# Patient Record
Sex: FEMALE | Race: BLACK OR AFRICAN AMERICAN | Employment: FULL TIME | URBAN - METROPOLITAN AREA
[De-identification: names, ages, dates, MRNs, and addresses within clinical notes are randomized per-mention and may not be internally consistent; named-entity substitution may affect disease eponyms.]

---

## 2017-01-27 NOTE — TELEPHONE ENCOUNTER
Return call made to pt using two identifiers. Pt made aware that if she  still having issues with the cough form Nov.  she need to come in to be evaluated  Because it's been so long and the provider will not give her a Rx for Tussinex or Tessalon Perles with out evaluating her. . Pt stated that they are short at work right now. I advised Pt to try the OTC Robitussin or Delsm and if she doesn't get any better then come in to be seen. Pt verbalized understanding.

## 2017-01-27 NOTE — TELEPHONE ENCOUNTER
Patient called and requested a refill of the Tussionex. I informed her that that type of medication requires an appointment because it is considered a narcotic drug. Patient then requested Tessalon perles instead. She states that her cough has gotten a little better but it is still lingering.

## 2017-03-02 ENCOUNTER — OFFICE VISIT (OUTPATIENT)
Dept: FAMILY MEDICINE CLINIC | Age: 57
End: 2017-03-02

## 2017-03-02 VITALS
HEART RATE: 71 BPM | TEMPERATURE: 98 F | RESPIRATION RATE: 18 BRPM | HEIGHT: 67 IN | DIASTOLIC BLOOD PRESSURE: 88 MMHG | SYSTOLIC BLOOD PRESSURE: 138 MMHG | WEIGHT: 223 LBS | BODY MASS INDEX: 35 KG/M2 | OXYGEN SATURATION: 95 %

## 2017-03-02 DIAGNOSIS — I10 ESSENTIAL HYPERTENSION: Primary | ICD-10-CM

## 2017-03-02 DIAGNOSIS — Z12.39 BREAST CANCER SCREENING: ICD-10-CM

## 2017-03-02 DIAGNOSIS — M54.50 CHRONIC BILATERAL LOW BACK PAIN WITHOUT SCIATICA: ICD-10-CM

## 2017-03-02 DIAGNOSIS — L30.8 OTHER ECZEMA: ICD-10-CM

## 2017-03-02 DIAGNOSIS — G89.29 CHRONIC BILATERAL LOW BACK PAIN WITHOUT SCIATICA: ICD-10-CM

## 2017-03-02 RX ORDER — PREDNISONE 20 MG/1
20 TABLET ORAL
Qty: 7 TAB | Refills: 0 | Status: SHIPPED | OUTPATIENT
Start: 2017-03-02 | End: 2018-04-29

## 2017-03-02 RX ORDER — TRAMADOL HYDROCHLORIDE 50 MG/1
50 TABLET ORAL
Qty: 30 TAB | Refills: 0 | Status: SHIPPED | OUTPATIENT
Start: 2017-03-02 | End: 2018-04-17 | Stop reason: SDUPTHER

## 2017-03-02 RX ORDER — CLOBETASOL PROPIONATE 0.5 MG/G
CREAM TOPICAL 2 TIMES DAILY
Qty: 30 G | Refills: 3 | Status: SHIPPED | OUTPATIENT
Start: 2017-03-02 | End: 2018-04-17 | Stop reason: ALTCHOICE

## 2017-03-02 NOTE — PROGRESS NOTES
Chronic Illness Visit    Today's Date:  3/2/2017   Patient's Name: Lizabeth Jerome   Patient's :  1960     History:     Chief Complaint   Patient presents with    Follow Up Chronic Condition     HTN     Dry Skin     pt presents for eczema  pt states \" that pt was using Triamcinolone ointment and Betamethasone but it sems liek the medications are not working \"    Medication Refill     Pain medication        Lizabeth Jerome is a 60 y.o. female presenting for a follow up of Chronic Illness. Hypertension/Hyperlipidemia     BP today is not at goal and less [id number] improved on recheck. Patients risk factors include: obese  Patient is checking home BP    Reports compliance and denies side effects to medications  Daily exercise and diet are as follows: denies healthy diet or exercise  Weight is stable  Takes the below meds regularly with no side effects. Last LDL: 147    Eczema     Having an acute flare on the right ankle/shin since the last visit. She was improving with her steroid cream but the symptoms reoccurred over the past few weeks. Onset more than 10 years ago  Reports diffuse patches of dry skin (over upper extensor and legs)which worsens when the weather gets colder. Worse lesion is on the lower right leg.   Has not seen DERM in years  Reports symptoms are stable on steroid cream but says the cream is very expensive    Chronic Back Pain    Onset: more than 6 months  Denies history of trauma/injury  Character of Pain: achy/throbbing  Denies problem with bowels or urine  Alleviating/Agravating Factors: worse with excessive activity or walking  Current meds: tramadol prn            Past Medical History:   Diagnosis Date    Anemia NEC     Hypertension      Past Surgical History:   Procedure Laterality Date    HX CYST REMOVAL  late 20's or early 30's    Right     Social History     Social History    Marital status: SINGLE     Spouse name: N/A    Number of children: N/A    Years of education: N/A     Social History Main Topics    Smoking status: Never Smoker    Smokeless tobacco: Never Used    Alcohol use Yes      Comment: Socially    Drug use: No    Sexual activity: Not Asked     Other Topics Concern    None     Social History Narrative     Family History   Problem Relation Age of Onset    Hypertension Mother      No Known Allergies    Problem List:      Patient Active Problem List   Diagnosis Code    HTN (hypertension) I10    Abnormal uterine bleeding N93.9    Vitamin D deficiency E55.9    Elevated LDL cholesterol level E78.00    Iron deficiency anemia due to chronic blood loss D50.0       Medications:     Current Outpatient Prescriptions   Medication Sig    clobetasol (TEMOVATE) 0.05 % topical cream Apply  to affected area two (2) times a day.  predniSONE (DELTASONE) 20 mg tablet Take 1 Tab by mouth daily (with breakfast).  traMADol (ULTRAM) 50 mg tablet Take 1 Tab by mouth every eight (8) hours as needed for Pain. Max Daily Amount: 150 mg.    lisinopril-hydrochlorothiazide (PRINZIDE, ZESTORETIC) 20-12.5 mg per tablet Take 1 Tab by mouth daily.  naproxen (NAPROSYN) 500 mg tablet Take 1 Tab by mouth two (2) times daily (with meals).  predniSONE (DELTASONE) 20 mg tablet Take 1 Tab by mouth daily (with breakfast).  ibuprofen (MOTRIN) 800 mg tablet Take 1 Tab by mouth every eight (8) hours as needed for Pain.  ferrous sulfate 324 mg (65 mg iron) TbEC Take  by mouth. No current facility-administered medications for this visit.           Constitutional: negative for fevers, chills, sweats and fatigue  Respiratory: negative for cough, sputum or wheezing  Cardiovascular: negative for chest pain, chest pressure/discomfort, dyspnea  Gastrointestinal: negative for nausea, vomiting, diarrhea, constipation and abdominal pain  Musculoskeletal:positive for arthralgias and back pain, negative for myalgias  Neurological: negative for headaches and dizziness  Behavioral/Psych: negative for anxiety and depression    Physical Assessment:   VS:    Visit Vitals    /88    Pulse 71    Temp 98 °F (36.7 °C) (Oral)    Resp 18    Ht 5' 7\" (1.702 m)    Wt 223 lb (101.2 kg)    SpO2 95%    BMI 34.93 kg/m2       Lab Results   Component Value Date/Time    Sodium 139 09/23/2016 03:05 PM    Potassium 4.0 09/23/2016 03:05 PM    Chloride 105 09/23/2016 03:05 PM    CO2 29 09/23/2016 03:05 PM    Anion gap 5 09/23/2016 03:05 PM    Glucose 78 09/23/2016 03:05 PM    BUN 11 09/23/2016 03:05 PM    Creatinine 0.81 09/23/2016 03:05 PM    BUN/Creatinine ratio 14 09/23/2016 03:05 PM    GFR est AA >60 09/23/2016 03:05 PM    GFR est non-AA >60 09/23/2016 03:05 PM    Calcium 9.2 09/23/2016 03:05 PM       General:   Well-groomed, well-nourished, in no distress, pleasant, alert, appropriate and conversant. Mouth:  Good dentition, oropharynx WNL without membranes, exudates, petechiae or ulcers  Neck:   Neck supple, no swelling, mass or tenderness, no thyromegaly  Cardiovasc:   RRR, no MRG. Pulses 2+ and symmetric at distal extremities. Pulmonary:   Lungs clear bilaterally. Normal respiratory effort. Abdomen:   Abdomen soft, NT, ND, NAB. No masses or organomegaly. Extremities:   No edema, no TTP bilateral calves. LEs warm and well-perfused. Neuro:   Alert and oriented, no focal deficits. No facial asymmetry noted. Skin:    No rashes or jaundice  MSK:   Normal ROM, 5/5 muscle strength  Psych:  No pressured speech or abnormal thought content        Assessment/Plan & Orders:       1. Essential hypertension    2. Other eczema    3. Chronic bilateral low back pain without sciatica    4. Breast cancer screening        Orders Placed This Encounter    MORENA MAMMO BI SCREENING INCL CAD    clobetasol (TEMOVATE) 0.05 % topical cream    predniSONE (DELTASONE) 20 mg tablet    traMADol (ULTRAM) 50 mg tablet       HTN continue with current meds.  Encouraged compliance with meds.  Eczema will do prednisone x 1 week and send in stronger steroid cream. Patient declined DERM referral she states her insurance will not cover it  Chronic Back Pain will send in tramadol 30 tabs    Follow up in 2-3 months    *Plan of care reviewed with patient. Patient in agreement with plan and expresses understanding. All questions answered and patient encouraged to call or RTO if further questions or concerns.     Matthieu Russell MD  Family Medicine  3/2/2017  9:12 AM

## 2017-03-02 NOTE — PATIENT INSTRUCTIONS

## 2017-03-02 NOTE — LETTER
NOTIFICATION OF RETURN TO WORK 
 
3/2/2017 Ms. Pam Weber [address] [facility] [address] To Whom It May Concern: 
 
Pam Weber was under the care of [facility] today. She will return to work on 3/2/2017 with no restrictions. If there are questions or concerns please have the patient contact our office. Sincerely, Sridevi Benavidez MD

## 2017-05-18 ENCOUNTER — PATIENT OUTREACH (OUTPATIENT)
Dept: FAMILY MEDICINE CLINIC | Age: 57
End: 2017-05-18

## 2017-05-24 ENCOUNTER — PATIENT OUTREACH (OUTPATIENT)
Dept: FAMILY MEDICINE CLINIC | Age: 57
End: 2017-05-24

## 2017-06-26 ENCOUNTER — TELEPHONE (OUTPATIENT)
Dept: FAMILY MEDICINE CLINIC | Age: 57
End: 2017-06-26

## 2017-06-26 ENCOUNTER — PATIENT OUTREACH (OUTPATIENT)
Dept: FAMILY MEDICINE CLINIC | Age: 57
End: 2017-06-26

## 2017-06-26 DIAGNOSIS — Z12.31 ENCOUNTER FOR SCREENING MAMMOGRAM FOR BREAST CANCER: Primary | ICD-10-CM

## 2017-06-26 DIAGNOSIS — Z12.11 ENCOUNTER FOR FIT (FECAL IMMUNOCHEMICAL TEST) SCREENING: Primary | ICD-10-CM

## 2017-06-28 ENCOUNTER — HOSPITAL ENCOUNTER (OUTPATIENT)
Dept: MAMMOGRAPHY | Age: 57
Discharge: HOME | End: 2017-06-28
Attending: FAMILY MEDICINE
Payer: COMMERCIAL

## 2017-06-28 DIAGNOSIS — Z12.31 VISIT FOR SCREENING MAMMOGRAM: ICD-10-CM

## 2017-06-28 PROCEDURE — 77063 BREAST TOMOSYNTHESIS BI: CPT

## 2017-07-14 ENCOUNTER — HOSPITAL ENCOUNTER (EMERGENCY)
Age: 57
Discharge: ANOTHER HOSPITAL | End: 2017-07-14
Attending: EMERGENCY MEDICINE
Payer: OTHER MISCELLANEOUS

## 2017-07-14 ENCOUNTER — APPOINTMENT (OUTPATIENT)
Dept: GENERAL RADIOLOGY | Age: 57
End: 2017-07-14
Attending: EMERGENCY MEDICINE
Payer: OTHER MISCELLANEOUS

## 2017-07-14 ENCOUNTER — APPOINTMENT (OUTPATIENT)
Dept: CT IMAGING | Age: 57
End: 2017-07-14
Attending: EMERGENCY MEDICINE
Payer: OTHER MISCELLANEOUS

## 2017-07-14 VITALS
SYSTOLIC BLOOD PRESSURE: 179 MMHG | RESPIRATION RATE: 18 BRPM | DIASTOLIC BLOOD PRESSURE: 100 MMHG | HEART RATE: 85 BPM | OXYGEN SATURATION: 100 % | TEMPERATURE: 98.1 F

## 2017-07-14 DIAGNOSIS — S00.81XA FOREHEAD ABRASION, INITIAL ENCOUNTER: ICD-10-CM

## 2017-07-14 DIAGNOSIS — S27.0XXA TRAUMATIC PNEUMOTHORAX, INITIAL ENCOUNTER: ICD-10-CM
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
## 2017-07-14 DIAGNOSIS — I10 ESSENTIAL HYPERTENSION: ICD-10-CM

## 2017-07-14 DIAGNOSIS — S02.2XXB OPEN FRACTURE OF NASAL BONE, INITIAL ENCOUNTER: ICD-10-CM

## 2017-07-14 DIAGNOSIS — S22.018A OTHER CLOSED FRACTURE OF FIRST THORACIC VERTEBRA, INITIAL ENCOUNTER (HCC): ICD-10-CM

## 2017-07-14 DIAGNOSIS — S22.43XA CLOSED FRACTURE OF MULTIPLE RIBS OF BOTH SIDES, INITIAL ENCOUNTER: Primary | ICD-10-CM

## 2017-07-14 DIAGNOSIS — S01.81XA LACERATION OF FACE, INITIAL ENCOUNTER: ICD-10-CM

## 2017-07-14 DIAGNOSIS — S12.601A CLOSED NONDISPLACED FRACTURE OF SEVENTH CERVICAL VERTEBRA, UNSPECIFIED FRACTURE MORPHOLOGY, INITIAL ENCOUNTER (HCC): ICD-10-CM

## 2017-07-14 LAB
ALBUMIN SERPL BCP-MCNC: 3.6 G/DL (ref 3.4–5)
ALBUMIN/GLOB SERPL: 0.8 {RATIO} (ref 0.8–1.7)
ALP SERPL-CCNC: 122 U/L (ref 45–117)
ALT SERPL-CCNC: 16 U/L (ref 13–56)
AMORPH CRY URNS QL MICRO: ABNORMAL
ANION GAP BLD CALC-SCNC: 7 MMOL/L (ref 3–18)
APPEARANCE UR: CLEAR
APTT PPP: 28.8 SEC (ref 23–36.4)
AST SERPL W P-5'-P-CCNC: 15 U/L (ref 15–37)
BACTERIA URNS QL MICRO: ABNORMAL /HPF
BASOPHILS # BLD AUTO: 0 K/UL (ref 0–0.06)
BASOPHILS # BLD: 0 % (ref 0–2)
BILIRUB SERPL-MCNC: 0.4 MG/DL (ref 0.2–1)
BILIRUB UR QL: NEGATIVE
BUN SERPL-MCNC: 13 MG/DL (ref 7–18)
BUN/CREAT SERPL: 15 (ref 12–20)
CALCIUM SERPL-MCNC: 9.1 MG/DL (ref 8.5–10.1)
CHLORIDE SERPL-SCNC: 106 MMOL/L (ref 100–108)
CO2 SERPL-SCNC: 28 MMOL/L (ref 21–32)
COLOR UR: YELLOW
CREAT SERPL-MCNC: 0.88 MG/DL (ref 0.6–1.3)
DIFFERENTIAL METHOD BLD: ABNORMAL
EOSINOPHIL # BLD: 0.2 K/UL (ref 0–0.4)
EOSINOPHIL NFR BLD: 2 % (ref 0–5)
EPITH CASTS URNS QL MICRO: ABNORMAL /LPF (ref 0–5)
ERYTHROCYTE [DISTWIDTH] IN BLOOD BY AUTOMATED COUNT: 13.6 % (ref 11.6–14.5)
GLOBULIN SER CALC-MCNC: 4.4 G/DL (ref 2–4)
GLUCOSE SERPL-MCNC: 148 MG/DL (ref 74–99)
GLUCOSE UR STRIP.AUTO-MCNC: NEGATIVE MG/DL
HCT VFR BLD AUTO: 44.7 % (ref 35–45)
HGB BLD-MCNC: 14.7 G/DL (ref 12–16)
HGB UR QL STRIP: ABNORMAL
INR PPP: 1 (ref 0.8–1.2)
KETONES UR QL STRIP.AUTO: NEGATIVE MG/DL
LEUKOCYTE ESTERASE UR QL STRIP.AUTO: NEGATIVE
LYMPHOCYTES # BLD AUTO: 29 % (ref 21–52)
LYMPHOCYTES # BLD: 2.9 K/UL (ref 0.9–3.6)
MCH RBC QN AUTO: 29.3 PG (ref 24–34)
MCHC RBC AUTO-ENTMCNC: 32.9 G/DL (ref 31–37)
MCV RBC AUTO: 89 FL (ref 74–97)
MONOCYTES # BLD: 0.5 K/UL (ref 0.05–1.2)
MONOCYTES NFR BLD AUTO: 5 % (ref 3–10)
NEUTS SEG # BLD: 6.2 K/UL (ref 1.8–8)
NEUTS SEG NFR BLD AUTO: 64 % (ref 40–73)
NITRITE UR QL STRIP.AUTO: NEGATIVE
PH UR STRIP: 8 [PH] (ref 5–8)
PLATELET # BLD AUTO: 287 K/UL (ref 135–420)
PMV BLD AUTO: 8.5 FL (ref 9.2–11.8)
POTASSIUM SERPL-SCNC: 3.4 MMOL/L (ref 3.5–5.5)
PROT SERPL-MCNC: 8 G/DL (ref 6.4–8.2)
PROT UR STRIP-MCNC: 30 MG/DL
PROTHROMBIN TIME: 12.6 SEC (ref 11.5–15.2)
RBC # BLD AUTO: 5.02 M/UL (ref 4.2–5.3)
RBC #/AREA URNS HPF: NEGATIVE /HPF (ref 0–5)
SODIUM SERPL-SCNC: 141 MMOL/L (ref 136–145)
SP GR UR REFRACTOMETRY: 1.02 (ref 1–1.03)
UROBILINOGEN UR QL STRIP.AUTO: 1 EU/DL (ref 0.2–1)
WBC # BLD AUTO: 9.7 K/UL (ref 4.6–13.2)
WBC URNS QL MICRO: ABNORMAL /HPF (ref 0–4)

## 2017-07-14 PROCEDURE — 74011250636 HC RX REV CODE- 250/636: Performed by: EMERGENCY MEDICINE

## 2017-07-14 PROCEDURE — 96365 THER/PROPH/DIAG IV INF INIT: CPT

## 2017-07-14 PROCEDURE — 74011000258 HC RX REV CODE- 258: Performed by: EMERGENCY MEDICINE

## 2017-07-14 PROCEDURE — 85025 COMPLETE CBC W/AUTO DIFF WBC: CPT | Performed by: EMERGENCY MEDICINE

## 2017-07-14 PROCEDURE — 71010 XR CHEST PORT: CPT

## 2017-07-14 PROCEDURE — 85730 THROMBOPLASTIN TIME PARTIAL: CPT | Performed by: EMERGENCY MEDICINE

## 2017-07-14 PROCEDURE — 96375 TX/PRO/DX INJ NEW DRUG ADDON: CPT

## 2017-07-14 PROCEDURE — 77030018836 HC SOL IRR NACL ICUM -A

## 2017-07-14 PROCEDURE — 70486 CT MAXILLOFACIAL W/O DYE: CPT

## 2017-07-14 PROCEDURE — 85610 PROTHROMBIN TIME: CPT | Performed by: EMERGENCY MEDICINE

## 2017-07-14 PROCEDURE — 72125 CT NECK SPINE W/O DYE: CPT

## 2017-07-14 PROCEDURE — 74011250637 HC RX REV CODE- 250/637: Performed by: EMERGENCY MEDICINE

## 2017-07-14 PROCEDURE — 80053 COMPREHEN METABOLIC PANEL: CPT | Performed by: EMERGENCY MEDICINE

## 2017-07-14 PROCEDURE — 77030031132 HC SUT NYL COVD -A

## 2017-07-14 PROCEDURE — 71250 CT THORAX DX C-: CPT

## 2017-07-14 PROCEDURE — 96376 TX/PRO/DX INJ SAME DRUG ADON: CPT

## 2017-07-14 PROCEDURE — 90715 TDAP VACCINE 7 YRS/> IM: CPT | Performed by: EMERGENCY MEDICINE

## 2017-07-14 PROCEDURE — 81001 URINALYSIS AUTO W/SCOPE: CPT | Performed by: EMERGENCY MEDICINE

## 2017-07-14 PROCEDURE — 99285 EMERGENCY DEPT VISIT HI MDM: CPT

## 2017-07-14 PROCEDURE — 75810000293 HC SIMP/SUPERF WND  RPR

## 2017-07-14 PROCEDURE — 70450 CT HEAD/BRAIN W/O DYE: CPT

## 2017-07-14 PROCEDURE — 90471 IMMUNIZATION ADMIN: CPT

## 2017-07-14 RX ORDER — CEFAZOLIN SODIUM 1 G/3ML
1 INJECTION, POWDER, FOR SOLUTION INTRAMUSCULAR; INTRAVENOUS
Status: DISCONTINUED | OUTPATIENT
Start: 2017-07-14 | End: 2017-07-14 | Stop reason: CLARIF

## 2017-07-14 RX ORDER — MORPHINE SULFATE 4 MG/ML
4 INJECTION, SOLUTION INTRAMUSCULAR; INTRAVENOUS
Status: COMPLETED | OUTPATIENT
Start: 2017-07-14 | End: 2017-07-14

## 2017-07-14 RX ORDER — OXYCODONE AND ACETAMINOPHEN 5; 325 MG/1; MG/1
2 TABLET ORAL
Status: COMPLETED | OUTPATIENT
Start: 2017-07-14 | End: 2017-07-14

## 2017-07-14 RX ORDER — OXYMETAZOLINE HCL 0.05 %
2 SPRAY, NON-AEROSOL (ML) NASAL
Status: COMPLETED | OUTPATIENT
Start: 2017-07-14 | End: 2017-07-14

## 2017-07-14 RX ORDER — ONDANSETRON 2 MG/ML
4 INJECTION INTRAMUSCULAR; INTRAVENOUS ONCE
Status: COMPLETED | OUTPATIENT
Start: 2017-07-14 | End: 2017-07-14

## 2017-07-14 RX ADMIN — CEFAZOLIN 1 G: 1 INJECTION, POWDER, FOR SOLUTION INTRAMUSCULAR; INTRAVENOUS; PARENTERAL at 14:28

## 2017-07-14 RX ADMIN — Medication 4 MG: at 14:29

## 2017-07-14 RX ADMIN — OXYMETAZOLINE HYDROCHLORIDE 2 SPRAY: 5 SPRAY NASAL at 12:55

## 2017-07-14 RX ADMIN — Medication 4 MG: at 11:10

## 2017-07-14 RX ADMIN — OXYCODONE HYDROCHLORIDE AND ACETAMINOPHEN 2 TABLET: 5; 325 TABLET ORAL at 12:57

## 2017-07-14 RX ADMIN — TETANUS TOXOID, REDUCED DIPHTHERIA TOXOID AND ACELLULAR PERTUSSIS VACCINE, ADSORBED 0.5 ML: 5; 2.5; 8; 8; 2.5 SUSPENSION INTRAMUSCULAR at 12:55

## 2017-07-14 RX ADMIN — ONDANSETRON 4 MG: 2 INJECTION INTRAMUSCULAR; INTRAVENOUS at 11:12

## 2017-07-14 NOTE — ED NOTES
No tenderness to abdomen or pelvis, right side of lower ribs hurt, safety intact, will continue to monitor

## 2017-07-14 NOTE — ED NOTES
PT face laceration cleaned and patient changed into gown, techs assisted to use bedpan, safety intact, will continue to monitor

## 2017-07-14 NOTE — ED TRIAGE NOTES
PT arrived via EMS after falling through ceiling onto cement floor, ccollar in place, patient reports back pain and rib pain, laceration to face and bleeding, Ax4, denies LOC from fall, safety intact, will continue to monitor

## 2017-07-14 NOTE — ED NOTES
CCollar placed back on patient for transport to [facility], PT stable at time of transport, [facility] transport team at bedside, safety intact,

## 2017-07-14 NOTE — ED PROVIDER NOTES
HPI Comments: 11:00 AM  Yaa Yip is a 60 y.o. Female presenting to the ED via EMS after falling on her left side of the body (12 ft fall), onset PTA. Reports moving boxes and fell through a hanging roof. Associated sxs include mid back pain, bilateral CP, and laceration across bridge of nose. Reports last Testanus was ~1yr ago. Pt denies taking blood thinners, social etoh use, numbess on buttocks, smoking, allergies to medication, and any other symptoms or complaints.                      The history is provided by the patient. Past Medical History:   Diagnosis Date    Anemia NEC     Hypertension        Past Surgical History:   Procedure Laterality Date    HX CYST REMOVAL  late 20's or early 29's    Right         Family History:   Problem Relation Age of Onset    Hypertension Mother        Social History     Social History    Marital status: SINGLE     Spouse name: N/A    Number of children: N/A    Years of education: N/A     Occupational History    Not on file. Social History Main Topics    Smoking status: Never Smoker    Smokeless tobacco: Never Used    Alcohol use Yes      Comment: Socially    Drug use: No    Sexual activity: Not on file     Other Topics Concern    Not on file     Social History Narrative         ALLERGIES: Review of patient's allergies indicates no known allergies. Review of Systems   Cardiovascular: Positive for chest pain. Gastrointestinal: Negative for abdominal pain and vomiting. Genitourinary: Positive for flank pain. Musculoskeletal: Positive for back pain. Negative for neck pain. Skin: Positive for wound (nose laceration). Neurological: Negative for syncope and numbness. All other systems reviewed and are negative.       Vitals:    07/14/17 1025 07/14/17 1322   BP: (!) 182/104 (!) 176/102   Pulse: 88 85   Resp: 18 18   Temp: 97.8 °F (36.6 °C)    SpO2: 98% 99%            Physical Exam   Constitutional: She is oriented to person, place, and time. She appears well-developed. She appears distressed. obese   HENT:   Head: Normocephalic. Nasal bridge laceration, 1cm. Abrasion to forehead. Left epistaxis; ?septal hematoma. Dentition intact. Soft palate not mobile,   Eyes: EOM are normal. Pupils are equal, round, and reactive to light. No entrapment   Neck: No JVD present. No tracheal deviation present. No thyromegaly present. In c-collar    Inferior midline cervical spine TTP   Cardiovascular: Normal rate, regular rhythm, normal heart sounds and intact distal pulses. Exam reveals no gallop and no friction rub. No murmur heard. Pulmonary/Chest: Effort normal and breath sounds normal. No stridor. No respiratory distress. She has no wheezes. She has no rales. She exhibits no tenderness. No CW TTP   Abdominal: Soft. She exhibits no distension and no mass. There is no tenderness. There is no rebound and no guarding. Soft, non-tender   Musculoskeletal: She exhibits tenderness. She exhibits no edema. Diffuse superior thoracic spine TTP   Lymphadenopathy:     She has no cervical adenopathy. Neurological: She is alert and oriented to person, place, and time. Skin: Skin is warm and dry. No rash noted. She is not diaphoretic. No erythema. No pallor. Psychiatric: She has a normal mood and affect. Her behavior is normal. Thought content normal.   Nursing note and vitals reviewed. RESULTS:      XR CHEST PORT   Final Result  IMPRESSION:     No acute findings. As read by the radiologist.               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 Correlate for any  new symptoms and if clinically warranted, could correlate with MRI brain.     Nasal bone fractures, overlying swelling and probably laceration.     Left frontal scalp hematoma. Possibly trace mastoid effusion on the left.   As read by the radiologist.   Jane Eden CONT    (Results Pending)  IMPRESSION:     Comminuted bilateral nasal bone fractures with depression on the left. Fracture  may extend through the anterior superior aspect of the nasal septum.     Soft tissue swelling and laceration over the bridge of the nose. Also scalp  hematoma in the left frontal region.     Orbital floors intact.     Minimal chronic sinusitis. As read by the radiologist.   Laisha Marie CONT    (Results Pending)  IMPRESSION:      1. ?No evidence of an acute traumatic injury to the cervical spine  As read by the radiologist.   CT CHEST ABD PELV WO CONT    (Results Pending)  Impression:  1. Enlarged, fibroid uterus  2. Pleural plaques  3. Nonacute ununited right posterior third rib fracture  4. No acute injuries are noted. As read by the radiologist.        Labs Reviewed   CBC WITH AUTOMATED DIFF - Abnormal; Notable for the following:        Result Value    MPV 8.5 (*)     All other components within normal limits   METABOLIC PANEL, COMPREHENSIVE - Abnormal; Notable for the following:     Potassium 3.4 (*)     Glucose 148 (*)     Alk.  phosphatase 122 (*)     Globulin 4.4 (*)     All other components within normal limits   URINALYSIS W/ RFLX MICROSCOPIC - Abnormal; Notable for the following:     Protein 30 (*)     Blood TRACE (*)     All other components within normal limits   URINE MICROSCOPIC ONLY - Abnormal; Notable for the following:     Bacteria 2+ (*)     Amorphous Crystals 1+ (*)     All other components within normal limits   PTT   PROTHROMBIN TIME + INR       Recent Results (from the past 12 hour(s))   CBC WITH AUTOMATED DIFF    Collection Time: 07/14/17 10:26 AM   Result Value Ref Range    WBC 9.7 4.6 - 13.2 K/uL    RBC 5.02 4.20 - 5.30 M/uL    HGB 14.7 12.0 - 16.0 g/dL    HCT 44.7 35.0 - 45.0 %    MCV 89.0 74.0 - 97.0 FL    MCH 29.3 24.0 - 34.0 PG    MCHC 32.9 31.0 - 37.0 g/dL    RDW 13.6 11.6 - 14.5 %    PLATELET 015 373 - 417 K/uL    MPV 8.5 (L) 9.2 - 11.8 FL    NEUTROPHILS 64 40 - 73 % LYMPHOCYTES 29 21 - 52 %    MONOCYTES 5 3 - 10 %    EOSINOPHILS 2 0 - 5 %    BASOPHILS 0 0 - 2 %    ABS. NEUTROPHILS 6.2 1.8 - 8.0 K/UL    ABS. LYMPHOCYTES 2.9 0.9 - 3.6 K/UL    ABS. MONOCYTES 0.5 0.05 - 1.2 K/UL    ABS. EOSINOPHILS 0.2 0.0 - 0.4 K/UL    ABS. BASOPHILS 0.0 0.0 - 0.06 K/UL    DF AUTOMATED     METABOLIC PANEL, COMPREHENSIVE    Collection Time: 07/14/17 10:26 AM   Result Value Ref Range    Sodium 141 136 - 145 mmol/L    Potassium 3.4 (L) 3.5 - 5.5 mmol/L    Chloride 106 100 - 108 mmol/L    CO2 28 21 - 32 mmol/L    Anion gap 7 3.0 - 18 mmol/L    Glucose 148 (H) 74 - 99 mg/dL    BUN 13 7.0 - 18 MG/DL    Creatinine 0.88 0.6 - 1.3 MG/DL    BUN/Creatinine ratio 15 12 - 20      GFR est AA >60 >60 ml/min/1.73m2    GFR est non-AA >60 >60 ml/min/1.73m2    Calcium 9.1 8.5 - 10.1 MG/DL    Bilirubin, total 0.4 0.2 - 1.0 MG/DL    ALT (SGPT) 16 13 - 56 U/L    AST (SGOT) 15 15 - 37 U/L    Alk.  phosphatase 122 (H) 45 - 117 U/L    Protein, total 8.0 6.4 - 8.2 g/dL    Albumin 3.6 3.4 - 5.0 g/dL    Globulin 4.4 (H) 2.0 - 4.0 g/dL    A-G Ratio 0.8 0.8 - 1.7     PTT    Collection Time: 07/14/17 10:26 AM   Result Value Ref Range    aPTT 28.8 23.0 - 36.4 SEC   PROTHROMBIN TIME + INR    Collection Time: 07/14/17 10:26 AM   Result Value Ref Range    Prothrombin time 12.6 11.5 - 15.2 sec    INR 1.0 0.8 - 1.2     URINALYSIS W/ RFLX MICROSCOPIC    Collection Time: 07/14/17 10:45 AM   Result Value Ref Range    Color YELLOW      Appearance CLEAR      Specific gravity 1.017 1.005 - 1.030      pH (UA) 8.0 5.0 - 8.0      Protein 30 (A) NEG mg/dL    Glucose NEGATIVE  NEG mg/dL    Ketone NEGATIVE  NEG mg/dL    Bilirubin NEGATIVE  NEG      Blood TRACE (A) NEG      Urobilinogen 1.0 0.2 - 1.0 EU/dL    Nitrites NEGATIVE  NEG      Leukocyte Esterase NEGATIVE  NEG     URINE MICROSCOPIC ONLY    Collection Time: 07/14/17 10:45 AM   Result Value Ref Range    WBC 2 to 4 0 - 4 /hpf    RBC NEGATIVE  0 - 5 /hpf    Epithelial cells 2+ 0 - 5 /lpf Bacteria 2+ (A) NEG /hpf    Amorphous Crystals 1+ (A) NEG          MDM  Number of Diagnoses or Management Options  Closed fracture of multiple ribs of both sides, initial encounter:   Closed nondisplaced fracture of seventh cervical vertebra, unspecified fracture morphology, initial encounter [facility]):   Essential hypertension:   Fall, initial encounter:   Forehead abrasion, initial encounter:   Laceration of face, initial encounter:   Open fracture of nasal bone, initial encounter:   Other closed fracture of first thoracic vertebra, initial encounter [facility]):   Traumatic pneumothorax, initial encounter:   Diagnosis management comments: Differential: polytrauma; fx; ICH; CVA; lacerations; abrasion; shock; intra-abd hemorrhage; pneumothorax; hemothorax; MI    Sending patient BRAVO alert to [facility]. Pt initially discussed as potential inpatient admission w/Codi who recommended against this and have pt accepted by Dr. Porter Samuels. Called radiology and CT; imaging to be copied onto disks. Tetanus updated; given pain meds, Ancef. C-collar replaced.        Amount and/or Complexity of Data Reviewed  Clinical lab tests: ordered and reviewed  Tests in the radiology section of CPT®: reviewed and ordered (CXR. CT head, CT maxillofacial, CT spine cerv, CT chest abd pelv.   )  Obtain history from someone other than the patient: yes  Discuss the patient with other providers: yes      ED Course     MEDICATIONS GIVEN:  Medications   ceFAZolin (ANCEF) 1 g in 0.9% sodium chloride (MBP/ADV) 50 mL MBP (1 g IntraVENous New Bag 7/14/17 1428)   morphine injection 4 mg (4 mg IntraVENous Given 7/14/17 1110)   diph,Pertuss(AC),Tet Vac-PF (BOOSTRIX) suspension 0.5 mL (0.5 mL IntraMUSCular Given 7/14/17 1255)   ondansetron (ZOFRAN) injection 4 mg (4 mg IntraVENous Given 7/14/17 1112)   oxymetazoline (AFRIN) 0.05 % nasal spray 2 Spray (2 Sprays Left Nostril Given 7/14/17 1255)   oxyCODONE-acetaminophen (PERCOCET) 5-325 mg per tablet 2 Tab (2 Tabs Oral Given 7/14/17 1257)   morphine injection 4 mg (4 mg IntraVENous Given 7/14/17 1429)       Wound Repair  Date/Time: 7/14/2017 12:35 PM  Performed by: Eduar Corea provider: Martha Leon  Preparation: skin prepped with Shur-Clens  Pre-procedure re-eval: Immediately prior to the procedure, the patient was reevaluated and found suitable for the planned procedure and any planned medications. Time out: Immediately prior to the procedure a time out was called to verify the correct patient, procedure, equipment, staff and marking as appropriate. .  Location details: face and nose  Wound length:2.5 cm or less  Anesthesia: local infiltration    Anesthesia:  Anesthesia: local infiltration  Local Anesthetic: lidocaine 1% without epinephrine   Anesthetic total: 2 mL  Foreign bodies: no foreign bodies  Irrigation solution: saline  Irrigation method: syringe  Debridement: none  Skin closure: 5-0 nylon  Number of sutures: 3  Technique: simple and interrupted  Approximation: loose  Dressing: 4x4  Patient tolerance: Patient tolerated the procedure well with no immediate complications  My total time at bedside, performing this procedure was 1-15 minutes. Recent Results (from the past 12 hour(s))   CBC WITH AUTOMATED DIFF    Collection Time: 07/14/17 10:26 AM   Result Value Ref Range    WBC 9.7 4.6 - 13.2 K/uL    RBC 5.02 4.20 - 5.30 M/uL    HGB 14.7 12.0 - 16.0 g/dL    HCT 44.7 35.0 - 45.0 %    MCV 89.0 74.0 - 97.0 FL    MCH 29.3 24.0 - 34.0 PG    MCHC 32.9 31.0 - 37.0 g/dL    RDW 13.6 11.6 - 14.5 %    PLATELET 037 839 - 207 K/uL    MPV 8.5 (L) 9.2 - 11.8 FL    NEUTROPHILS 64 40 - 73 %    LYMPHOCYTES 29 21 - 52 %    MONOCYTES 5 3 - 10 %    EOSINOPHILS 2 0 - 5 %    BASOPHILS 0 0 - 2 %    ABS. NEUTROPHILS 6.2 1.8 - 8.0 K/UL    ABS. LYMPHOCYTES 2.9 0.9 - 3.6 K/UL    ABS. MONOCYTES 0.5 0.05 - 1.2 K/UL    ABS. EOSINOPHILS 0.2 0.0 - 0.4 K/UL    ABS.  BASOPHILS 0.0 0.0 - 0.06 K/UL    DF AUTOMATED     METABOLIC PANEL, COMPREHENSIVE    Collection Time: 07/14/17 10:26 AM   Result Value Ref Range    Sodium 141 136 - 145 mmol/L    Potassium 3.4 (L) 3.5 - 5.5 mmol/L    Chloride 106 100 - 108 mmol/L    CO2 28 21 - 32 mmol/L    Anion gap 7 3.0 - 18 mmol/L    Glucose 148 (H) 74 - 99 mg/dL    BUN 13 7.0 - 18 MG/DL    Creatinine 0.88 0.6 - 1.3 MG/DL    BUN/Creatinine ratio 15 12 - 20      GFR est AA >60 >60 ml/min/1.73m2    GFR est non-AA >60 >60 ml/min/1.73m2    Calcium 9.1 8.5 - 10.1 MG/DL    Bilirubin, total 0.4 0.2 - 1.0 MG/DL    ALT (SGPT) 16 13 - 56 U/L    AST (SGOT) 15 15 - 37 U/L    Alk. phosphatase 122 (H) 45 - 117 U/L    Protein, total 8.0 6.4 - 8.2 g/dL    Albumin 3.6 3.4 - 5.0 g/dL    Globulin 4.4 (H) 2.0 - 4.0 g/dL    A-G Ratio 0.8 0.8 - 1.7     PTT    Collection Time: 07/14/17 10:26 AM   Result Value Ref Range    aPTT 28.8 23.0 - 36.4 SEC   PROTHROMBIN TIME + INR    Collection Time: 07/14/17 10:26 AM   Result Value Ref Range    Prothrombin time 12.6 11.5 - 15.2 sec    INR 1.0 0.8 - 1.2     URINALYSIS W/ RFLX MICROSCOPIC    Collection Time: 07/14/17 10:45 AM   Result Value Ref Range    Color YELLOW      Appearance CLEAR      Specific gravity 1.017 1.005 - 1.030      pH (UA) 8.0 5.0 - 8.0      Protein 30 (A) NEG mg/dL    Glucose NEGATIVE  NEG mg/dL    Ketone NEGATIVE  NEG mg/dL    Bilirubin NEGATIVE  NEG      Blood TRACE (A) NEG      Urobilinogen 1.0 0.2 - 1.0 EU/dL    Nitrites NEGATIVE  NEG      Leukocyte Esterase NEGATIVE  NEG     URINE MICROSCOPIC ONLY    Collection Time: 07/14/17 10:45 AM   Result Value Ref Range    WBC 2 to 4 0 - 4 /hpf    RBC NEGATIVE  0 - 5 /hpf    Epithelial cells 2+ 0 - 5 /lpf    Bacteria 2+ (A) NEG /hpf    Amorphous Crystals 1+ (A) NEG       PROGRESS NOTE:  11:00 AM  Initial assessment performed.      CONSULT NOTE:   2:11 PM  Justen Erickson PA-C spoke with Dr. Kiko Velasquez Specialty: Surgery  Dr. Kiko Velasquez (surgery), the on call trauma provider at [facility] has accepted patient, bravo alert. Spoke with Dr. Estrellita Mojica  who was alerted of the conversation with Dr. Keya Lilly and will send in patient now. DISCHARGE NOTE:  2:46 PM    Susan Vizcaino  results have been reviewed with her. She has been counseled regarding her diagnosis, treatment, and plan. She verbally conveys understanding and agreement of the signs, symptoms, diagnosis, treatment and prognosis and additionally agrees to follow up as discussed. She also agrees with the care-plan and conveys that all of her questions have been answered. I have also provided discharge instructions for her that include: educational information regarding their diagnosis and treatment, and list of reasons why they would want to return to the ED prior to their follow-up appointment, should her condition change. The patient has been provided with education for proper Emergency Department utilization. CLINICAL IMPRESSION:    1. Closed fracture of multiple ribs of both sides, initial encounter    2. Closed nondisplaced fracture of seventh cervical vertebra, unspecified fracture morphology, initial encounter ([facility] [address]    3. Other closed fracture of first thoracic vertebra, initial encounter ([facility] [address]    4. Traumatic pneumothorax, initial encounter    5. Open fracture of nasal bone, initial encounter    6. Fall, initial encounter    7. Forehead abrasion, initial encounter    8. Laceration of face, initial encounter    9. Essential hypertension        PLAN: DISCHARGE HOME    Follow-up Information     None          Current Discharge Medication List              SCRIBE ATTESTATION STATEMENT  Documented by: Rakan Flores, bowenibing for and in the presence of Thien Monroy PA-C.     PROVIDER ATTESTATION STATEMENT  I personally performed the services described in the documentation, reviewed the documentation, as recorded by the scribe in my presence, and it accurately and completely records my words and actions.   Thien Monroy PA-C.

## 2017-07-17 ENCOUNTER — PATIENT OUTREACH (OUTPATIENT)
Dept: FAMILY MEDICINE CLINIC | Age: 57
End: 2017-07-17

## 2017-07-17 NOTE — PROGRESS NOTES
NN health screenings:    Planned to f/u with Ms Berenice Conrad regarding health screenings but read about her tragic fall last week and transport to [facility]. Will continue to follow after her discharge from [facility].

## 2017-08-23 ENCOUNTER — HOSPITAL ENCOUNTER (OUTPATIENT)
Dept: PHYSICAL THERAPY | Age: 57
Discharge: HOME | End: 2017-08-23
Payer: OTHER MISCELLANEOUS

## 2017-08-23 PROCEDURE — 97110 THERAPEUTIC EXERCISES: CPT

## 2017-08-23 PROCEDURE — 97163 PT EVAL HIGH COMPLEX 45 MIN: CPT

## 2017-08-23 NOTE — PROGRESS NOTES
PT DAILY TREATMENT NOTE     Patient Name: Trace Pérez  Date:2017  : 1960  [x]  Patient  Verified  Payor: Francois Park / Plan: [address] / Product Type: Workers Comp /    In time:3:40  Out time:4:20  Total Treatment Time (min): 40  Visit #: 1 of 10    Treatment Area: Bilateral shoulder pain [M25.511, M25.512]  Neck pain [M54.2]  Unsteadiness on feet [R26.81]  Back pain [M54.9]  Rib pain on left side [R07.81]     SUBJECTIVE  Pain Level (0-10 scale): 7-8/10  Any medication changes, allergies to medications, adverse drug reactions, diagnosis change, or new procedure performed?: [x] No    [] Yes (see summary sheet for update)  Subjective functional status/changes:   [x] See paper initial evaluation form in patient chart. OBJECTIVE    32 min [x]Eval                  []Re-Eval     8 min Therapeutic Exercise:  [] See flow sheet : HEP given and reviewed with Pt   Rationale: increase ROM to improve the patients ability to increase ease with ADLs    With   [] TE   [] TA   [] neuro   [] other: Patient Education: [x] Review HEP    [] Progressed/Changed HEP based on:   [] positioning   [] body mechanics   [] transfers   [] heat/ice application    [] other:      Other Objective/Functional Measures: L/S FOTO 22 pts; NOC FOTO 41 pts     Pain Level (0-10 scale) post treatment: -8/10    ASSESSMENT/Changes in Function:     Patient will continue to benefit from skilled PT services to address functional mobility deficits, address ROM deficits, address strength deficits, analyze and address soft tissue restrictions, analyze and cue movement patterns, analyze and modify body mechanics/ergonomics, assess and modify postural abnormalities, address imbalance/dizziness and instruct in home and community integration to attain remaining goals.      [x]  See Plan of Care  []  See progress note/recertification  []  See Discharge Summary         PLAN  []  Upgrade activities as tolerated     [x]  Continue plan of care  []  Update interventions per flow sheet       []  Discharge due to:_  []  Other:_      Marissa Gordon, PT 8/23/2017  5:47 PM

## 2017-08-23 NOTE — MR AVS SNAPSHOT
Visit Information Date & Time Provider Department Dept. Phone Encounter #  
 8/23/2017  3:30 PM [facility] [address], [facility] [phone] [id number] Upcoming Health Maintenance Date Due Hepatitis C Screening 1960 FOBT Q 1 YEAR AGE 50-75 7/23/2008 PAP AKA CERVICAL CYTOLOGY 1/31/2017 INFLUENZA AGE 9 TO ADULT 8/1/2017 BREAST CANCER SCRN MAMMOGRAM 6/28/2019 DTaP/Tdap/Td series (2 - Td) 7/14/2027 Allergies as of 8/23/2017  Review Complete On: 3/2/2017 By: Celia Miles MD  
 No Known Allergies Current Immunizations  Never Reviewed Name Date Tdap 7/14/2017 12:55 PM  
  
 Not reviewed this visit Vitals OB Status Smoking Status Ablation Never Smoker Your Updated Medication List  
  
   
This list is accurate as of: 8/23/17  3:17 PM.  Always use your most recent med list.  
  
  
  
  
 clobetasol 0.05 % topical cream  
Commonly known as:  Orbie Deter Apply  to affected area two (2) times a day. ferrous sulfate 324 mg (65 mg iron) tablet Take  by mouth. ibuprofen 800 mg tablet Commonly known as:  MOTRIN Take 1 Tab by mouth every eight (8) hours as needed for Pain. lisinopril-hydroCHLOROthiazide 20-12.5 mg per tablet Commonly known as:  Eather Revel Take 1 Tab by mouth daily. naproxen 500 mg tablet Commonly known as:  NAPROSYN Take 1 Tab by mouth two (2) times daily (with meals). * predniSONE 20 mg tablet Commonly known as:  Montse Ada Take 1 Tab by mouth daily (with breakfast). * predniSONE 20 mg tablet Commonly known as:  Montse Ada Take 1 Tab by mouth daily (with breakfast). traMADol 50 mg tablet Commonly known as:  ULTRAM  
Take 1 Tab by mouth every eight (8) hours as needed for Pain. Max Daily Amount: 150 mg.  
  
 * Notice:   This list has 2 medication(s) that are the same as other medications prescribed for you. Read the directions carefully, and ask your doctor or other care provider to review them with you. To-Do List   
 08/23/2017 3:30 PM  
  Appointment with Teo Cooper PT at [facility] ([phone]) Introducing [address]! Dear Ermias Edmondson: 
Thank you for requesting a [employer] account. Our records indicate that you already have an active [employer] account. You can access your account anytime at [email or web address]. [email or web address] Did you know that you can access your hospital and ER discharge instructions at any time in [employer]? You can also review all of your test results from your hospital stay or ER visit. Additional Information If you have questions, please visit the Frequently Asked Questions section of the [employer] website at [email or web address] Remember, [employer] is NOT to be used for urgent needs. For medical emergencies, dial 911. Now available from your iPhone and Android! Please provide this summary of care documentation to your next provider. Your primary care clinician is listed as Jasmyne Reis. If you have any questions after today's visit, please call [phone].

## 2017-08-23 NOTE — PROGRESS NOTES
[facility], [address] [place] [address]  [phone] fax  Plan of Care/ Statement of Necessity for Physical Therapy Services     Patient name: Paulette Guzman Start of Care: 2017   Referral source: Melly Browning, [address] : 1960    Medical Diagnosis: Bilateral shoulder pain [M25.511, M25.512]  Neck pain [M54.2]  Unsteadiness on feet [R26.81]  Back pain [M54.9]  Rib pain on left side [R07.81]   Onset Date:17    Treatment Diagnosis: Neck, Back, UE/LE pain   Prior Hospitalization: see medical history Provider#: [id number]   Medications: Verified on Patient summary List    Comorbidities: BMI > 30; HTN   Prior Level of Function: Lives in [facility] alone; works as Operations Dispatch Assistant; functionally independent    The Plan of Care and following information is based on the information from the initial evaluation. Assessment/ key information: Pt is a 59 y.o. female who presents with c/o diffuse pain, weakness, and limited mobility. On 17, Pt was trying to open a vent in the ceiling, when she stepped on a railing for balance that gave way, causing Pt to fall 12 ft from second floor loft, landing face first on concrete floor. Pt sustained 6 fx ribs, 2 vertebral fx, nasal bone fx, punctured lung, lacerated R toe, and bruising to arms, hips. Pt was in hospital x 2 days then released to home. Pt has difficulty with all ADLs but notes inability to reach for items overhead, limited sitting tolerance, inability to sleep from pain, and inability to drive secondary to limited C/S AROM.   Upon exam, Pt exhibited diffuse palpable tenderness along B UT, C/S paraspinals, chest, T/S, L/S parapsinals, and hips; limited C/S AROM of FF 30 deg, Ext 20 deg, SBR 30 deg, SBL 39 deg, RR 62 deg, RL 30 deg - all with pain; decreased B Sh Scaption AROM of 110 deg L, 130 deg R - with pain; limited L/S AROM of FF 75%, Ext 50%, SBR/SBL 25%, RR/RL 50% - all with pain; 5/5 B LE strength with resultant LBP; (+) supine bridge test, stabilizing mainly with LB musculature; and inflexibility in B quads/HS. Pt would benefit from skilled PT to address above deficits to improve Pt's function and ability to return to work with less pain. Evaluation Complexity History HIGH Complexity :3+ comorbidities / personal factors will impact the outcome/ POC ; Examination MEDIUM Complexity : 3 Standardized tests and measures addressing body structure, function, activity limitation and / or participation in recreation  ;Presentation MEDIUM Complexity : Evolving with changing characteristics  ; Clinical Decision Making HIGH Complexity : FOTO score of 1- 25   Overall Complexity Rating: HIGH   Problem List: pain affecting function, decrease ROM, decrease strength, edema affecting function, impaired gait/ balance, decrease ADL/ functional abilitiies, decrease activity tolerance, decrease flexibility/ joint mobility and decrease transfer abilities   Treatment Plan may include any combination of the following: Therapeutic exercise, Therapeutic activities, Neuromuscular re-education, Physical agent/modality, Gait/balance training, Manual therapy, Aquatic therapy and Patient education  Patient / Family readiness to learn indicated by: asking questions, trying to perform skills and interest  Persons(s) to be included in education: patient (P)  Barriers to Learning/Limitations: None  Patient Goal (s): Improve mobility and reduce pain.   Patient Self Reported Health Status: good  Rehabilitation Potential: good    Short Term Goals: To be accomplished in 1 weeks:  Goal: Pt to be compliant with initial HEP to improve cervical and lumbar mobility. Status at last note/certification: Established    Long Term Goals: To be accomplished in 5 weeks:  Goal: Pt to increase C/S AROM by at least 5 deg all planes to be able to return to driving.   Status at last note/ceritification: FF 30 deg, Ext 20 deg, SBR 30 deg, SBL 39 deg, RR 62 deg, RL 30 deg - all with pain  Goal: Pt to increase B Sh Scaption AROM to WNL without pain to be able to reach items overhead without difficulty. Status at last note/certification: 275 deg L, 130 deg R - with pain  Goal: Pt to improve L/S AROM to 75% of full all planes without increased pain for ease with dressing/bathing. Status at last note/certification: FF 09%, Ext 50%, SBR/SBL 25%, RR/RL 50% - all with pain  Goal: Pt to report 5/10 pain at worst to be able to sleep at night for better rest.  Status at last note/certification: 90/98 pain at worst  Goal: Pt to report back FOTO score of 43 pts to show improved function. Status at last note/certification: FOTO 22 pts    Frequency / Duration: Patient to be seen 2 times per week for 5 weeks. Patient/ Caregiver education and instruction: Diagnosis, prognosis, exercises   [x]  Plan of care has been reviewed with MELISSA Aguilar, PT 8/23/2017 5:48 PM  _____________________________________________________________________  I certify that the above Therapy Services are being furnished while the patient is under my care. I agree with the treatment plan and certify that this therapy is necessary.     Physician's Signature:____________________  Date:__________Time:______    Please sign and return to [facility] KAN RAMIREZ [address]  [phone] fax

## 2017-08-30 ENCOUNTER — HOSPITAL ENCOUNTER (OUTPATIENT)
Dept: PHYSICAL THERAPY | Age: 57
Discharge: HOME | End: 2017-08-30
Payer: OTHER MISCELLANEOUS

## 2017-08-30 PROCEDURE — 97110 THERAPEUTIC EXERCISES: CPT

## 2017-08-30 NOTE — PROGRESS NOTES
PT DAILY TREATMENT NOTE 3-16    Patient Name: Joana Powell  Date:2017  : 1960  [x]  Patient  Verified  Payor: /    In time:4:14  Out time:4:54  Total Treatment Time (min): 40  Visit #: 2 of 10    Treatment Area: Bilateral shoulder pain [M25.511, M25.512]  Neck pain [M54.2]  Unsteadiness on feet [R26.81]  Back pain [M54.9]  Rib pain on left side [R07.81]    SUBJECTIVE  Pain Level (0-10 scale): 6.5/10  Any medication changes, allergies to medications, adverse drug reactions, diagnosis change, or new procedure performed?: [x] No    [] Yes (see summary sheet for update)  Subjective functional status/changes:   [] No changes reported  Patient reports compliance with HEP.      OBJECTIVE  Modality rationale: decrease pain and increase tissue extensibility to improve the patients ability to ease ADL tolerance   Min Type Additional Details    [] Estim:  []Unatt       []IFC  []Premod                        []Other:  []w/ice   []w/heat  Position:  Location:    [] Estim: []Att    []TENS instruct  []NMES                    []Other:  []w/US   []w/ice   []w/heat  Position:  Location:    []  Traction: [] Cervical       []Lumbar                       [] Prone          []Supine                       []Intermittent   []Continuous Lbs:  [] before manual  [] after manual    []  Ultrasound: []Continuous   [] Pulsed                           []1MHz   []3MHz Location:  W/cm2:    []  Iontophoresis with dexamethasone         Location: [] Take home patch   [] In clinic   10 []  Ice     [x]  heat  []  Ice massage  []  Laser   []  Anodyne Position: c/s and l/s  Location: seated    []  Laser with stim  []  Other: Position:  Location:    []  Vasopneumatic Device Pressure:       [] lo [] med [] hi   Temperature: [] lo [] med [] hi   [x] Skin assessment post-treatment:  [x]intact []redness- no adverse reaction    []redness  adverse reaction:     30 min Therapeutic Exercise:  [x] See flow sheet :   Rationale: increase ROM, increase strength and improve coordination to improve the patients ability to increase ease with ADLs           With   [] TE   [] TA   [] neuro   [] other: Patient Education: [x] Review HEP    [] Progressed/Changed HEP based on:   [] positioning   [] body mechanics   [] transfers   [] heat/ice application    [] other:      Other Objective/Functional Measures: first follow up session--cues for sequencing and correct form throughout   Cues for optimal UE positioning in // bars to reduce UT hike  Frequent cueing for TA engagement    Patient is 14 minutes late    Patient reports \"pop\" in l/s during standing hip ext reps, noted no audible pop--reports pain, therapist has patient stop reps immediately. Patient reports pain diminishes slightly post rest break. Pain Level (0-10 scale) post treatment: 7    ASSESSMENT/Changes in Function: Initiated POC per flowsheet. Patient is a pleasure to treat and puts forth good effort with exercises, mainly being limited by pain. Patient requires frequent cueing to perform all exercises within tolerable range. Slight increase in pain post session even with modalities, patient stating, \"It is mostly in my neck. \" Will continue per POC. Patient will continue to benefit from skilled PT services to modify and progress therapeutic interventions, address functional mobility deficits, address ROM deficits, address strength deficits, analyze and address soft tissue restrictions, analyze and cue movement patterns, analyze and modify body mechanics/ergonomics and assess and modify postural abnormalities to attain remaining goals. []  See Plan of Care  []  See progress note/recertification  []  See Discharge Summary         Short Term Goals: To be accomplished in 1 weeks:  Goal: Pt to be compliant with initial HEP to improve cervical and lumbar mobility. Status at last note/certification: Established  Current: met per patient report (8/30/2017)     Long Term Goals:  To be accomplished in 5 weeks:  Goal: Pt to increase C/S AROM by at least 5 deg all planes to be able to return to driving. Status at last note/ceritification: FF 30 deg, Ext 20 deg, SBR 30 deg, SBL 39 deg, RR 62 deg, RL 30 deg - all with pain  Goal: Pt to increase B Sh Scaption AROM to WNL without pain to be able to reach items overhead without difficulty. Status at last note/certification: 338 deg L, 130 deg R - with pain  Goal: Pt to improve L/S AROM to 75% of full all planes without increased pain for ease with dressing/bathing. Status at last note/certification: FF 37%, Ext 50%, SBR/SBL 25%, RR/RL 50% - all with pain  Goal: Pt to report 5/10 pain at worst to be able to sleep at night for better rest.  Status at last note/certification: 92/23 pain at worst  Goal: Pt to report back FOTO score of 43 pts to show improved function.   Status at last note/certification: FOTO 22 pts    PLAN  []  Upgrade activities as tolerated     [x]  Continue plan of care  []  Update interventions per flow sheet       []  Discharge due to:_  []  Other:_      Wang Tom 8/30/2017  4:11 PM    Future Appointments  Date Time Provider Kirby Garrett   9/1/2017 4:00 PM Bertha Dumont PT [facility]   9/6/2017 5:00 PM Peyton Aguilar [facility]   9/8/2017 4:00  [address] [facility]   9/13/2017 4:30 PM Peyton Aguilar [facility]   9/15/2017 4:00  [address] [facility]   9/20/2017 4:30 PM Makenna Mcke[facility]   9/22/2017 4:00  [address] [facility]   9/27/2017 4:30 PM Peyton Aguilar PT [facility]   9/29/2017 4:00  [address] [facility]

## 2017-09-01 ENCOUNTER — HOSPITAL ENCOUNTER (OUTPATIENT)
Dept: PHYSICAL THERAPY | Age: 57
Discharge: HOME | End: 2017-09-01
Payer: OTHER MISCELLANEOUS

## 2017-09-01 PROCEDURE — 97110 THERAPEUTIC EXERCISES: CPT

## 2017-09-01 NOTE — PROGRESS NOTES
PT DAILY TREATMENT NOTE     Patient Name: Roge Slippery Rock  Date:2017  : 1960  [x]  Patient  Verified  [facility] / Plan: [address] [place] [address] / Product Type: Workers Comp /    [facility] time:420  Total Treatment Time (min): 38  Visit #: 3 of 10    Treatment Area: Bilateral shoulder pain [M25.511, M25.512]  Neck pain [M54.2]  Unsteadiness on feet [R26.81]  Back pain [M54.9]  Rib pain on left side [R07.81]    SUBJECTIVE  Pain Level (0-10 scale): 7  Any medication changes, allergies to medications, adverse drug reactions, diagnosis change, or new procedure performed?: [x] No    [] Yes (see summary sheet for update)  Subjective functional status/changes:   [] No changes reported  It's my left shoulder and neck and down my back.      OBJECTIVE    Modality rationale: decrease pain and increase tissue extensibility to improve the patients ability to improve activity tolerance   Min Type Additional Details    [] Estim:  []Unatt       []IFC  []Premod                        []Other:  []w/ice   []w/heat  Position:  Location:    [] Estim: []Att    []TENS instruct  []NMES                    []Other:  []w/US   []w/ice   []w/heat  Position:  Location:    []  Traction: [] Cervical       []Lumbar                       [] Prone          []Supine                       []Intermittent   []Continuous Lbs:  [] before manual  [] after manual    []  Ultrasound: []Continuous   [] Pulsed                           []1MHz   []3MHz W/cm2:  Location:    []  Iontophoresis with dexamethasone         Location: [] Take home patch   [] In clinic   10 []  Ice     [x]  heat  []  Ice massage  []  Laser   []  Anodyne Position:reclined  Location:neck, back    []  Laser with stim  []  Other:  Position:  Location:    []  Vasopneumatic Device Pressure:       [] lo [] med [] hi   Temperature: [] lo [] med [] hi   [] Skin assessment post-treatment:  []intact []redness- no adverse reaction []redness  adverse reaction:     28 min Therapeutic Exercise:  [x] See flow sheet :   Rationale: increase ROM and increase strength to improve the patients ability to perform daily tasks with greater ease    With   [] TE   [] TA   [] neuro   [] other: Patient Education: [x] Review HEP    [] Progressed/Changed HEP based on:   [] positioning   [] body mechanics   [] transfers   [] heat/ice application    [] other:      Other Objective/Functional Measures: completed exercises per flow sheet     Pain Level (0-10 scale) post treatment: 8    ASSESSMENT/Changes in Function: Increased Left hip pain with standing LE exercises. Patient requires plinth to be reclined to help support neck and back. Increased pain after session but Patient states she is okay. Patient will continue to benefit from skilled PT services to modify and progress therapeutic interventions, address functional mobility deficits, address ROM deficits, address strength deficits, analyze and address soft tissue restrictions, analyze and cue movement patterns, analyze and modify body mechanics/ergonomics, assess and modify postural abnormalities, address imbalance/dizziness and instruct in home and community integration to attain remaining goals. []  See Plan of Care  []  See progress note/recertification  []  See Discharge Summary         Progress towards goals / Updated goals:  Short Term Goals: To be accomplished in 1 weeks:  Goal: Pt to be compliant with initial HEP to improve cervical and lumbar mobility. Status at last note/certification: Established  Current: met per patient report (8/30/2017)  [address] be accomplished in 5 weeks:  Goal: Pt to increase C/S AROM by at least 5 deg all planes to be able to return to driving.   Status at last note/ceritification: FF 30 deg, Ext 20 deg, SBR 30 deg, SBL 39 deg, RR 62 deg, RL 30 deg - all with pain  Current status:   Goal: Pt to increase B Sh Scaption AROM to WNL without pain to be able to reach items overhead without difficulty. Status at last note/certification: 754 deg L, 130 deg R - with pain  Current status:   Goal: Pt to improve L/S AROM to 75% of full all planes without increased pain for ease with dressing/bathing. Status at last note/certification: FF 88%, Ext 50%, SBR/SBL 25%, RR/RL 50% - all with pain  Current status:   Goal: Pt to report 5/10 pain at worst to be able to sleep at night for better rest.  Status at last note/certification: 16/75 pain at worst  Current status: Progressing, no drastic decrease  Goal: Pt to report back FOTO score of 43 pts to show improved function.   Status at last note/certification: FOTO 22 pts  Current status: Assess 5th visit    PLAN  [x]  Upgrade activities as tolerated     [x]  Continue plan of care  []  Update interventions per flow sheet       []  Discharge due to:_  []  Other:_      Celia Aguilar, PT 9/1/2017  1:18 PM    Future Appointments  Date Time Provider Kirby Garrett   9/1/2017 4:00 PM Celia Aguilar, [facility]   9/6/2017 5:00 PM Peyton Aguilar, [facility]   9/8/2017 4:00  [address] [facility]   9/13/2017 4:30 PM Peyton Aguilar [facility]   9/15/2017 4:00  [address] [facility]   9/20/2017 4:30 PM Chauncey Sommers [facility]   9/22/2017 4:00  [address] [facility]   9/27/2017 4:30 PM Peyton Aguilar, [facility]   9/29/2017 4:00  [address] [facility]

## 2017-09-05 ENCOUNTER — PATIENT OUTREACH (OUTPATIENT)
Dept: FAMILY MEDICINE CLINIC | Age: 57
End: 2017-09-05

## 2017-09-05 NOTE — PROGRESS NOTES
health screenings:    Ms. Nayana Costa sent in her FIT sample in July. Jeff has no record of it reaching their lab. I have requested the office staff of Dr. Zen Cortez send her another kit and Ms Nayana Costa has graciously agreed to repeat her testing. Bretbenjamín Mehta will send out the kit today. I recommended she take the sample into Dr. Zen Cortez office and let them transport it to [facility]. She is going to attempt to have her f/u workman's comp appointments be scheduled with Dr. Zen Cortez and I have told her Dr. Zen Cortez will return September 11th.

## 2017-09-06 ENCOUNTER — HOSPITAL ENCOUNTER (OUTPATIENT)
Dept: PHYSICAL THERAPY | Age: 57
Discharge: HOME | End: 2017-09-06
Payer: OTHER MISCELLANEOUS

## 2017-09-06 PROCEDURE — 97110 THERAPEUTIC EXERCISES: CPT

## 2017-09-06 NOTE — PROGRESS NOTES
PT DAILY TREATMENT NOTE     Patient Name: Cash Becerra  Date:2017  : 1960  [x]  Patient  Verified  Payor: Guy Shea / Plan: [id number] / Product Type: Workers Comp /    In time:5:00  Out time:5:42  Total Treatment Time (min): 42  Visit #: 4 of 10    Treatment Area: Bilateral shoulder pain [M25.511, M25.512]  Neck pain [M54.2]  Unsteadiness on feet [R26.81]  Back pain [M54.9]  Rib pain on left side [R07.81]    SUBJECTIVE  Pain Level (0-10 scale): 6/10  Any medication changes, allergies to medications, adverse drug reactions, diagnosis change, or new procedure performed?: [x] No    [] Yes (see summary sheet for update)  Subjective functional status/changes:   [] No changes reported  \"Not as much pain today. I talked with my Worker's Comp person and I will be switching my care after Dr. Oliver Sahni returns from maternity to her - she is my PCP - so I won't have to go back to [facility]. So, I will have more details next week but I will not be seeing Dr. Nehemias Rae anymore. \"    OBJECTIVE    Modality rationale: decrease pain and increase tissue extensibility to improve the patients ability to improve activity tolerance   Min Type Additional Details    [] Estim:  []Unatt       []IFC  []Premod                        []Other:  []w/ice   []w/heat  Position:  Location:    [] Estim: []Att    []TENS instruct  []NMES                    []Other:  []w/US   []w/ice   []w/heat  Position:  Location:    []  Traction: [] Cervical       []Lumbar                       [] Prone          []Supine                       []Intermittent   []Continuous Lbs:  [] before manual  [] after manual    []  Ultrasound: []Continuous   [] Pulsed                           []1MHz   []3MHz W/cm2:  Location:    []  Iontophoresis with dexamethasone         Location: [] Take home patch   [] In clinic   10 []  Ice     [x]  heat  []  Ice massage  []  Laser   []  Anodyne Position:reclined  Location:neck, back    []  Laser with stim  []  Other:  Position:  Location:    []  Vasopneumatic Device Pressure:       [] lo [] med [] hi   Temperature: [] lo [] med [] hi   [] Skin assessment post-treatment:  []intact []redness- no adverse reaction    []redness  adverse reaction:     32 min Therapeutic Exercise:  [x] See flow sheet :   Rationale: increase ROM and increase strength to improve the patients ability to perform daily tasks with greater ease    With   [] TE   [] TA   [] neuro   [] other: Patient Education: [x] Review HEP    [] Progressed/Changed HEP based on:   [] positioning   [] body mechanics   [] transfers   [] heat/ice application    [] other:      Other Objective/Functional Measures: Continued with Rx program per flow sheet. Pt still challenged with exercises. Plinth reclined with 3 pillows to support neck and lower back. Pain Level (0-10 scale) post treatment: 7/10    ASSESSMENT/Changes in Function: Pt notes limited sitting tolerance of no more than 30 minutes. Pt informed that therapy might have to stop and restart to establish care with Dr. Mita Jimenes as referral source. Pt understood. Will determine after Pt report next week. Patient will continue to benefit from skilled PT services to modify and progress therapeutic interventions, address functional mobility deficits, address ROM deficits, address strength deficits, analyze and address soft tissue restrictions, analyze and cue movement patterns, analyze and modify body mechanics/ergonomics, assess and modify postural abnormalities, address imbalance/dizziness and instruct in home and community integration to attain remaining goals. []  See Plan of Care  []  See progress note/recertification  []  See Discharge Summary         Progress towards goals / Updated goals:  Short Term Goals: To be accomplished in 1 weeks:  Goal: Pt to be compliant with initial HEP to improve cervical and lumbar mobility.   Status at last note/certification: Established  Current status: met per patient report (8/30/2017)  Long Term Goals: To be accomplished in 5 weeks:  Goal: Pt to increase C/S AROM by at least 5 deg all planes to be able to return to driving. Status at last note/ceritification: FF 30 deg, Ext 20 deg, SBR 30 deg, SBL 39 deg, RR 62 deg, RL 30 deg - all with pain  Current status: progressing - FF 35 deg, Ext 22 deg, SBR 25 deg, SBL 40 deg, RR 50 deg, RL 40 deg - all with pain  Goal: Pt to increase B Sh Scaption AROM to WNL without pain to be able to reach items overhead without difficulty. Status at last note/certification: 925 deg L, 130 deg R - with pain  Current status: progressing - 120 deg L, 130 deg R  Goal: Pt to improve L/S AROM to 75% of full all planes without increased pain for ease with dressing/bathing. Status at last note/certification: FF 19%, Ext 50%, SBR/SBL 25%, RR/RL 50% - all with pain  Current status:   Goal: Pt to report 5/10 pain at worst to be able to sleep at night for better rest.  Status at last note/certification: 52/02 pain at worst  Current status: progressing - 8-8.5/10  Goal: Pt to report back FOTO score of 43 pts to show improved function.   Status at last note/certification: FOTO 22 pts  Current status: reassess 5th visit    PLAN  [x]  Upgrade activities as tolerated     [x]  Continue plan of care  []  Update interventions per flow sheet       []  Discharge due to:_  [x]  Other:_reassess L/S AROM and FOTO next visit      Peyton Aguilar PT 9/6/2017  1:18 PM    Future Appointments  Date Time Provider Kirby Garrett   9/8/2017 4:00  [address] [facility]   9/13/2017 4:30 PM Peyton Aguilar PT [facility]   9/15/2017 4:00  [address] [facility]   9/18/2017 5:00 PM MD Karlene Donaldson   9/20/2017 4:30  [address] [facility]   9/22/2017 4:00  [address] [facility]   9/27/2017 4:30 PM Peyton Aguilar, PT [facility]   9/29/2017 4:00 PM Lis Alegre [id number]

## 2017-09-08 ENCOUNTER — HOSPITAL ENCOUNTER (OUTPATIENT)
Dept: PHYSICAL THERAPY | Age: 57
Discharge: HOME | End: 2017-09-08
Payer: OTHER MISCELLANEOUS

## 2017-09-08 PROCEDURE — 97110 THERAPEUTIC EXERCISES: CPT

## 2017-09-08 NOTE — PROGRESS NOTES
PT DAILY TREATMENT NOTE     Patient Name: Sharon Amaya  Date:2017  : 1960  [x]  Patient  Verified  Payor: [facility] / Plan: [id number] / Product Type:  Workers Comp /    In time:3:50  Out time  4:45  Total Treatment Time (min): 55  Visit #: 5 of 10    Treatment Area: Bilateral shoulder pain [M25.511, M25.512]  Neck pain [M54.2]  Unsteadiness on feet [R26.81]  Back pain [M54.9]  Rib pain on left side [R07.81]    SUBJECTIVE  Pain Level (0-10 scale): 6  Any medication changes, allergies to medications, adverse drug reactions, diagnosis change, or new procedure performed?: [x] No    [] Yes (see summary sheet for update)  Subjective functional status/changes:   [] No changes reported  I still have a lot of pain eaching and lifting    OBJECTIVE    Modality rationale: decrease pain to improve the patients ability to perform activites   Min Type Additional Details    [] Estim:  []Unatt       []IFC  []Premod                        []Other:  []w/ice   []w/heat  Position:  Location:    [] Estim: []Att    []TENS instruct  []NMES                    []Other:  []w/US   []w/ice   []w/heat  Position:  Location:    []  Traction: [] Cervical       []Lumbar                       [] Prone          []Supine                       []Intermittent   []Continuous Lbs:  [] before manual  [] after manual    []  Ultrasound: []Continuous   [] Pulsed                           []1MHz   []3MHz W/cm2:  Location:    []  Iontophoresis with dexamethasone         Location: [] Take home patch   [] In clinic   10 []  Ice     [x]  heat  []  Ice massage  []  Laser   []  Anodyne Position:  reclined  Location: ls    []  Laser with stim  []  Other:  Position:  Location:    []  Vasopneumatic Device Pressure:       [] lo [] med [] hi   Temperature: [] lo [] med [] hi   [x] Skin assessment post-treatment:  [x]intact []redness- no adverse reaction    []redness  adverse reaction:       45 min Therapeutic Exercise:  [] See flow sheet :   Rationale: increase ROM and increase strength to improve the patients ability to perform daily tasks            With   [] TE   [] TA   [] neuro   [] other: Patient Education: [x] Review HEP    [] Progressed/Changed HEP based on:   [] positioning   [] body mechanics   [] transfers   [] heat/ice application    [] other:      Other Objective/Functional Measures:      Pain Level (0-10 scale) post treatment: 6.5    ASSESSMENT/Changes in Function: unable to reach into cabinet without sharp pains inTS    Patient will continue to benefit from skilled PT services to modify and progress therapeutic interventions, address functional mobility deficits, address ROM deficits, address strength deficits, analyze and address soft tissue restrictions, analyze and cue movement patterns, analyze and modify body mechanics/ergonomics, assess and modify postural abnormalities, address imbalance/dizziness and instruct in home and community integration to attain remaining goals. []  See Plan of Care  []  See progress note/recertification  []  See Discharge Summary         Progress towards goals / Updated goals:  Goal: Pt to be compliant with initial HEP to improve cervical and lumbar mobility. Status at last note/certification: Established  Current status: met per patient report (8/30/2017)  Long Term Goals: To be accomplished in 5 weeks:  Goal: Pt to increase C/S AROM by at least 5 deg all planes to be able to return to driving. Status at last note/ceritification: FF 30 deg, Ext 20 deg, SBR 30 deg, SBL 39 deg, RR 62 deg, RL 30 deg - all with pain  Current status: progressing - FF 35 deg, Ext 22 deg, SBR 25 deg, SBL 40 deg, RR 50 deg, RL 40 deg - all with pain  Goal: Pt to increase B Sh Scaption AROM to WNL without pain to be able to reach items overhead without difficulty.   Status at last note/certification: 987 deg L, 130 deg R - with pain  Current status: progressing - 120 deg L, 130 deg R  Goal: Pt to improve L/S AROM to 75% of full all planes without increased pain for ease with dressing/bathing. Status at last note/certification: FF 38%, Ext 50%, SBR/SBL 25%, RR/RL 50% - all with pain  Current status:   Goal: Pt to report 5/10 pain at worst to be able to sleep at night for better rest.  Status at last note/certification: 70/10 pain at worst  Current status: progressing - 8-8.5/10  Goal: Pt to report back FOTO score of 43 pts to show improved function.   Status at last note/certification: FOTO 22 pts  Current status: FOTO 37    PLAN  [x]  Upgrade activities as tolerated     []  Continue plan of care  []  Update interventions per flow sheet       []  Discharge due to:_  []  Other:_      Enzo Chen PTA 9/8/2017  4:29 PM    Future Appointments  Date Time Provider Kirby Garrett   9/13/2017 4:30 PM Peyton Aguilar PT [facility]   9/15/2017 4:00  [address] [facility]   9/18/2017 5:00 PM Wilian Armstrong MD [facility]   9/20/2017 4:30  [address] [facility]   9/22/2017 4:00 PM Alma Regalado [facility]   9/27/2017 4:30 PM Peyton Aguilar PT [facility]   9/29/2017 4:00  [address] [facility]

## 2017-09-13 ENCOUNTER — HOSPITAL ENCOUNTER (OUTPATIENT)
Dept: PHYSICAL THERAPY | Age: 57
Discharge: HOME | End: 2017-09-13
Payer: OTHER MISCELLANEOUS

## 2017-09-13 PROCEDURE — 97112 NEUROMUSCULAR REEDUCATION: CPT

## 2017-09-13 PROCEDURE — 97110 THERAPEUTIC EXERCISES: CPT

## 2017-09-13 NOTE — PROGRESS NOTES
PT DAILY TREATMENT NOTE     Patient Name: Sunshine Burgess  Date:2017  : 1960  [x]  Patient  Verified  Payor: Katelyn Jeterroel / Plan: [id number] / Product Type: Workers Comp /    In time:4:30  Out time  5:25  Total Treatment Time (min): 55  Visit #: 6 of 10    Treatment Area: Bilateral shoulder pain [M25.511, M25.512]  Neck pain [M54.2]  Unsteadiness on feet [R26.81]  Back pain [M54.9]  Rib pain on left side [R07.81]    SUBJECTIVE  Pain Level (0-10 scale): 7/10  Any medication changes, allergies to medications, adverse drug reactions, diagnosis change, or new procedure performed?: [x] No    [] Yes (see summary sheet for update)  Subjective functional status/changes:   [] No changes reported  \"Today is not a good day. I feel like I have knots along my neck and shoulders and I still have a lot of pain with lifting the arms overhead. \"    OBJECTIVE    Modality rationale: decrease pain to improve the patients ability to perform activites   Min Type Additional Details    [] Estim:  []Unatt       []IFC  []Premod                        []Other:  []w/ice   []w/heat  Position:  Location:    [] Estim: []Att    []TENS instruct  []NMES                    []Other:  []w/US   []w/ice   []w/heat  Position:  Location:    []  Traction: [] Cervical       []Lumbar                       [] Prone          []Supine                       []Intermittent   []Continuous Lbs:  [] before manual  [] after manual    []  Ultrasound: []Continuous   [] Pulsed                           []1MHz   []3MHz W/cm2:  Location:    []  Iontophoresis with dexamethasone         Location: [] Take home patch   [] In clinic   10 []  Ice     [x]  heat  []  Ice massage  []  Laser   []  Anodyne Position:  reclined  Location: neck, low back    []  Laser with stim  []  Other:  Position:  Location:    []  Vasopneumatic Device Pressure:       [] lo [] med [] hi   Temperature: [] lo [] med [] hi   [x] Skin assessment post-treatment:  [x]intact []redness- no adverse reaction    []redness  adverse reaction:     37 min Therapeutic Exercise:  [] See flow sheet :   Rationale: increase ROM and increase strength to improve the patients ability to perform daily tasks    8 min Neuromuscular Re-education:  []  See flow sheet : KT distal to proximal, B UT, 30% tension   Rationale: increase proprioception  to improve the patients ability to turn head without muscle spasms or increased tightness     With   [] TE   [] TA   [] neuro   [] other: Patient Education: [x] Review HEP    [] Progressed/Changed HEP based on:   [] positioning   [] body mechanics   [] transfers   [] heat/ice application    [] other:      Other Objective/Functional Measures: Increased reps per flow sheet. Pt continued to note pulling pain in neck and shoulders, especially L shoulder. Pt sensitive to touch for manual therapy so unable to really address soft tissue restrictions. Applied KT to assist with muscle spasms and tightness. Pain Level (0-10 scale) post treatment: 7/10    ASSESSMENT/Changes in Function:  Pt continues to exhibit soft tissue restrictions and clicking in neck, hip, and lower back due to continued healing from traumatic injuries. No change in pain level after session. Patient will continue to benefit from skilled PT services to modify and progress therapeutic interventions, address functional mobility deficits, address ROM deficits, address strength deficits, analyze and address soft tissue restrictions, analyze and cue movement patterns, analyze and modify body mechanics/ergonomics, assess and modify postural abnormalities, address imbalance/dizziness and instruct in home and community integration to attain remaining goals.      []  See Plan of Care  []  See progress note/recertification  []  See Discharge Summary         Progress towards goals / Updated goals:  Goal: Pt to be compliant with initial HEP to improve cervical and lumbar mobility. Status at last note/certification: Established  Current status: met per patient report   [address] be accomplished in 5 weeks:  Goal: Pt to increase C/S AROM by at least 5 deg all planes to be able to return to driving. Status at last note/ceritification: FF 30 deg, Ext 20 deg, SBR 30 deg, SBL 39 deg, RR 62 deg, RL 30 deg - all with pain  Current status: progressing - FF 35 deg, Ext 22 deg, SBR 25 deg, SBL 40 deg, RR 50 deg, RL 40 deg - all with pain  Goal: Pt to increase B Sh Scaption AROM to WNL without pain to be able to reach items overhead without difficulty. Status at last note/certification: 902 deg L, 130 deg R - with pain  Current status: progressing - 120 deg L, 130 deg R  Goal: Pt to improve L/S AROM to 75% of full all planes without increased pain for ease with dressing/bathing. Status at last note/certification: FF 84%, Ext 50%, SBR/SBL 25%, RR/RL 50% - all with pain  Current status: progressing - FF 50%, Ext 25%, SBR/SBL 75%, RR/RL 75% - all with pain   Goal: Pt to report 5/10 pain at worst to be able to sleep at night for better rest.  Status at last note/certification: 10/53 pain at worst  Current status: progressing - 8-8.5/10 pain at worst  Goal: Pt to report back FOTO score of 43 pts to show improved function.   Status at last note/certification: FOTO 22 pts  Current status: progressing - FOTO 37 pts    PLAN  [x]  Upgrade activities as tolerated     []  Continue plan of care  []  Update interventions per flow sheet       []  Discharge due to:_  []  Other:_      Peyton Aguilar, PT 9/13/2017  4:29 PM    Future Appointments  Date Time Provider Kirby Garrett   9/15/2017 4:00 PM Evans Ford, PT [facility]   9/18/2017 5:00 PM John Desai MD Karlene Motley   9/20/2017 4:30  [address] [facility]   9/22/2017 4:00  [address] [facility]   9/27/2017 4:30 PM Peyton Aguilar, PT [facility]   9/29/2017 4:00  [address] [facility]

## 2017-09-15 ENCOUNTER — HOSPITAL ENCOUNTER (OUTPATIENT)
Dept: PHYSICAL THERAPY | Age: 57
Discharge: HOME | End: 2017-09-15
Payer: OTHER MISCELLANEOUS

## 2017-09-15 PROCEDURE — 97110 THERAPEUTIC EXERCISES: CPT

## 2017-09-15 PROCEDURE — 97112 NEUROMUSCULAR REEDUCATION: CPT

## 2017-09-15 NOTE — PROGRESS NOTES
PT DAILY TREATMENT NOTE     Patient Name: Paulette Guzman  Date:9/15/2017  : 1960  [x]  Patient  Verified  Payor: Aaron Guptas / Plan: [id number] / Product Type:  Workers Comp /    In time:3;55  Out time:4:30  Total Treatment Time (min): 35  Visit #: 7 of 10    Treatment Area: Bilateral shoulder pain [M25.511, M25.512]  Neck pain [M54.2]  Unsteadiness on feet [R26.81]  Back pain [M54.9]  Rib pain on left side [R07.81]    SUBJECTIVE  Pain Level (0-10 scale): 6  Any medication changes, allergies to medications, adverse drug reactions, diagnosis change, or new procedure performed?: [x] No    [] Yes (see summary sheet for update)  Subjective functional status/changes:   [] No changes reported  Doing OK    OBJECTIVE    Modality rationale: decrease pain and increase tissue extensibility to improve the patients ability to tolerate activity    Min Type Additional Details    [] Estim:  []Unatt       []IFC  []Premod                        []Other:  []w/ice   []w/heat  Position:  Location:    [] Estim: []Att    []TENS instruct  []NMES                    []Other:  []w/US   []w/ice   []w/heat  Position:  Location:    []  Traction: [] Cervical       []Lumbar                       [] Prone          []Supine                       []Intermittent   []Continuous Lbs:  [] before manual  [] after manual    []  Ultrasound: []Continuous   [] Pulsed                           []1MHz   []3MHz W/cm2:  Location:    []  Iontophoresis with dexamethasone         Location: [] Take home patch   [] In clinic   10 []  Ice     [x]  heat  []  Ice massage  []  Laser   []  Anodyne Position: supine  Location: neck    []  Laser with stim  []  Other:  Position:  Location:    []  Vasopneumatic Device Pressure:       [] lo [] med [] hi   Temperature: [] lo [] med [] hi   [x] Skin assessment post-treatment:  [x]intact []redness- no adverse reaction    []redness  adverse reaction:       17 min Therapeutic Exercise:  [] See flow sheet :   Rationale: increase ROM and increase strength to improve the patients ability to improve ease of function     8 min Neuromuscular Re-education:  []  See flow sheet :   Rationale: increase strength and improve coordination  to improve the patients ability to to turn head and report decreased tightenss    With   [] TE   [] TA   [] neuro   [] other: Patient Education: [x] Review HEP    [] Progressed/Changed HEP based on:   [] positioning   [] body mechanics   [] transfers   [] heat/ice application    [] other:      Other Objective/Functional Measures:      Pain Level (0-10 scale) post treatment:  6.5    ASSESSMENT/Changes in Function:  Pain with cs ROM, but is slowly improving    Patient will continue to benefit from skilled PT services to modify and progress therapeutic interventions, address functional mobility deficits, address ROM deficits, address strength deficits, analyze and address soft tissue restrictions, analyze and cue movement patterns, analyze and modify body mechanics/ergonomics, assess and modify postural abnormalities and address imbalance/dizziness to attain remaining goals. []  See Plan of Care  []  See progress note/recertification  []  See Discharge Summary         Progress towards goals / Updated goals:  Goal: Pt to be compliant with initial HEP to improve cervical and lumbar mobility. Status at last note/certification: Established  Current status: met per patient report   [address] be accomplished in 5 weeks:  Goal: Pt to increase C/S AROM by at least 5 deg all planes to be able to return to driving.   Status at last note/ceritification: FF 30 deg, Ext 20 deg, SBR 30 deg, SBL 39 deg, RR 62 deg, RL 30 deg - all with pain  Current status: progressing - FF 35 deg, Ext 22 deg, SBR 25 deg, SBL 40 deg, RR 50 deg, RL 40 deg - all with pain  Goal: Pt to increase B Sh Scaption AROM to WNL without pain to be able to reach items overhead without difficulty. Status at last note/certification: 968 deg L, 130 deg R - with pain  Current status: progressing - 120 deg L, 130 deg R  Goal: Pt to improve L/S AROM to 75% of full all planes without increased pain for ease with dressing/bathing. Status at last note/certification: FF 12%, Ext 50%, SBR/SBL 25%, RR/RL 50% - all with pain  Current status: progressing - FF 50%, Ext 25%, SBR/SBL 75%, RR/RL 75% - all with pain   Goal: Pt to report 5/10 pain at worst to be able to sleep at night for better rest.  Status at last note/certification: 49/81 pain at worst  Current status: progressing - 8-8.5/10 pain at worst  Goal: Pt to report back FOTO score of 43 pts to show improved function.   Status at last note/certification: FOTO 22 pts  Current status: progressing - FOTO 37 pts    PLAN  [x]  Upgrade activities as tolerated     []  Continue plan of care  []  Update interventions per flow sheet       []  Discharge due to:_  []  Other:_      Floy Goodpasture, PTA 9/15/2017  4:00 PM    Future Appointments  Date Time Provider Kirby Garrett   9/18/2017 5:00 PM Tiffany Gonzales MD [facility] Sylvie Motley   9/20/2017 4:30  [address] [facility]   9/22/2017 4:00 PM Jayme Becerra, PT [facility]   9/27/2017 4:30 PM Peyton Aguilar, PT [facility]   9/29/2017 4:00  [address] [facility]

## 2017-09-19 ENCOUNTER — PATIENT OUTREACH (OUTPATIENT)
Dept: FAMILY MEDICINE CLINIC | Age: 57
End: 2017-09-19

## 2017-09-19 NOTE — PROGRESS NOTES
[facility] health screening:    Has completed her mammogram and was negative. Cervical cancer screening is on hold until her back heals a bit more. Presently sleeps in a recliner and is utilizing a mobilizer with arms for any kind of transport such as riding in a car. Cannot lie flat for any length of time. She has promised when the back pain eases she will call Dr. Daryl Casno and make a well woman appointment.

## 2017-09-20 ENCOUNTER — HOSPITAL ENCOUNTER (OUTPATIENT)
Dept: PHYSICAL THERAPY | Age: 57
Discharge: HOME | End: 2017-09-20
Payer: OTHER MISCELLANEOUS

## 2017-09-20 PROCEDURE — 97112 NEUROMUSCULAR REEDUCATION: CPT

## 2017-09-20 PROCEDURE — 97110 THERAPEUTIC EXERCISES: CPT

## 2017-09-20 NOTE — PROGRESS NOTES
PT DAILY TREATMENT NOTE     Patient Name: Jackelyn Greer  Date:2017  : 1960  [x]  Patient  Verified  Payor: John Burns / Plan: [id number] / Product Type: Workers Comp /    In time:4:10  Out time: 4:55  Total Treatment Time (min): 45  Visit #: 8 of 10    Treatment Area: Bilateral shoulder pain [M25.511, M25.512]  Neck pain [M54.2]  Unsteadiness on feet [R26.81]  Back pain [M54.9]  Rib pain on left side [R07.81]    SUBJECTIVE  Pain Level (0-10 scale):  6  Any medication changes, allergies to medications, adverse drug reactions, diagnosis change, or new procedure performed?: [x] No    [] Yes (see summary sheet for update)  Subjective functional status/changes:   [] No changes reported  MD appointment Friday.   Will wait to see what he says about continuing PT.    OBJECTIVE    Modality rationale: decrease pain and increase tissue extensibility to improve the patients ability to perform daily tasks   Min Type Additional Details    [] Estim:  []Unatt       []IFC  []Premod                        []Other:  []w/ice   []w/heat  Position:  Location:    [] Estim: []Att    []TENS instruct  []NMES                    []Other:  []w/US   []w/ice   []w/heat  Position:  Location:    []  Traction: [] Cervical       []Lumbar                       [] Prone          []Supine                       []Intermittent   []Continuous Lbs:  [] before manual  [] after manual    []  Ultrasound: []Continuous   [] Pulsed                           []1MHz   []3MHz W/cm2:  Location:    []  Iontophoresis with dexamethasone         Location: [] Take home patch   [] In clinic   10 []  Ice     [x]  heat  []  Ice massage  []  Laser   []  Anodyne Position: reclined  Location: LB, neck    []  Laser with stim  []  Other:  Position:  Location:    []  Vasopneumatic Device Pressure:       [] lo [] med [] hi   Temperature: [] lo [] med [] hi   [x] Skin assessment post-treatment:  [x]intact []redness- no adverse reaction    []redness  adverse reaction:       27 min Therapeutic Exercise:  [] See flow sheet :   Rationale: increase ROM and increase strength to improve the patients ability to perform daily tasks      8 min Neuromuscular Re-education:  []  See flow sheet :   Rationale: increase strength and improve coordination  to improve the patients ability to perform CS ROM, reduce tightness       With   [] TE   [] TA   [] neuro   [] other: Patient Education: [x] Review HEP    [] Progressed/Changed HEP based on:   [] positioning   [] body mechanics   [] transfers   [] heat/ice application    [] other:      Other Objective/Functional Measures:   PD taping    Gains: slight reduction inLBP, no change in thoracic pain. Pain Best/Worst in LB: 4.5-8/10. Thoracic: 5-9/10  Deficits: limited sitting 40 minutes prior to changing positions. Sleep disturbance 2-3 hour intervals (unable to find position of comfort in neck and shoulders)  Unable to drive due to limited cs ROM. Pain Level (0-10 scale) post treatment:  6.5    ASSESSMENT/Changes in Function: see goals    Patient will continue to benefit from skilled PT services to modify and progress therapeutic interventions, address functional mobility deficits, address ROM deficits, address strength deficits, analyze and address soft tissue restrictions, analyze and cue movement patterns, analyze and modify body mechanics/ergonomics, assess and modify postural abnormalities, address imbalance/dizziness and instruct in home and community integration to attain remaining goals. []  See Plan of Care  []  See progress note/recertification  []  See Discharge Summary         Progress towards goals / Updated goals:  Goal: Pt to be compliant with initial HEP to improve cervical and lumbar mobility.   Status at last note/certification: Established  Current status: met per patient report   Long Term Goals: To be accomplished in 5 weeks:  Goal: Pt to increase C/S AROM by at least 5 deg all planes to be able to return to driving. Status at last note/ceritification: FF 30 deg, Ext 20 deg, SBR 30 deg, SBL 39 deg, RR 62 deg, RL 30 deg - all with pain  Current status: progressing - FF 35 deg, Ext 22 deg, SBR 25 deg, SBL 40 deg, RR 50 deg, RL 40 deg - all with pain  Goal: Pt to increase B Sh Scaption AROM to WNL without pain to be able to reach items overhead without difficulty. Status at last note/certification: 241 deg L, 130 deg R - with pain  Current status: progressing - 120 deg L, 130 deg R  Goal: Pt to improve L/S AROM to 75% of full all planes without increased pain for ease with dressing/bathing. Status at last note/certification: FF 18%, Ext 50%, SBR/SBL 25%, RR/RL 50% - all with pain  Current status: progressing - FF 50%, Ext 25%, SBR/SBL 75%, RR/RL 75% - all with pain   Goal: Pt to report 5/10 pain at worst to be able to sleep at night for better rest.  Status at last note/certification: 40/21 pain at worst  Current status: progressing - 8-8.5/10 pain at worst  Goal: Pt to report back FOTO score of 43 pts to show improved function.   Status at last note/certification: FOTO 22 pts  Current status: progressing - FOTO 37 pts    PLAN  [x]  Upgrade activities as tolerated     []  Continue plan of care  []  Update interventions per flow sheet       []  Discharge due to:_  []  Other:_      Lauren Russell, MELISSA 9/20/2017  4:12 PM    Future Appointments  Date Time Provider Kirby Garrett   9/20/2017 4:30  [address] [facility]   9/22/2017 4:00  [address] [facility]   9/27/2017 4:30 PM Peyton Aguilar, PT [facility]   9/29/2017 4:00 PM Mounika Taylor, PT [facility]

## 2017-09-21 NOTE — PROGRESS NOTES
[facility] [address] [place] [address]  [phone] fax    Progress Note  Patient name: Hermelinda Mccall Start of Care: 2017   Referral source: [place] Mari Crandall : 1960                          Medical Diagnosis: Bilateral shoulder pain [M25.511, M25.512]  Neck pain [M54.2]  Unsteadiness on feet [R26.81]  Back pain [M54.9]  Rib pain on left side [R07.81] Onset Date:17                          Treatment Diagnosis: Neck, Back, UE/LE pain   Prior Hospitalization: see medical history Provider#: [id number]   Medications: Verified on Patient summary List    Comorbidities: BMI > 30; HTN   Prior Level of Function: Lives in [facility] alone; works as Operations Dispatch Assistant; functionally independent    Visits from Start of Care: 8    Missed Visits: 0    Short Term Goals: To be accomplished in 1 week:  Goal: Pt to be compliant with initial HEP to improve cervical and lumbar mobility. Status at last note/certification: Established  Current status: met per patient report   [address] be accomplished in 5 weeks:  Goal: Pt to increase C/S AROM by at least 5 deg all planes to be able to return to driving. Status at last note/ceritification: FF 30 deg, Ext 20 deg, SBR 30 deg, SBL 39 deg, RR 62 deg, RL 30 deg - all with pain  Current status: progressing - FF 35 deg, Ext 22 deg, SBR 25 deg, SBL 40 deg, RR 50 deg, RL 40 deg - all with pain  Goal: Pt to increase B Sh Scaption AROM to WNL without pain to be able to reach items overhead without difficulty. Status at last note/certification: 433 deg L, 130 deg R - with pain  Current status: progressing - 120 deg L, 130 deg R  Goal: Pt to improve L/S AROM to 75% of full all planes without increased pain for ease with dressing/bathing.   Status at last note/certification: FF 59%, Ext 50%, SBR/SBL 25%, RR/RL 50% - all with pain  Current status: progressing - FF 50%, Ext 25%, SBR/SBL 75%, RR/RL 75% - all with pain   Goal: Pt to report 5/10 pain at worst to be able to sleep at night for better rest.  Status at last note/certification: 96/16 pain at worst  Current status: progressing - 8-8.5/10 pain at worst  Goal: Pt to report back FOTO score of 43 pts to show improved function. Status at last note/certification: FOTO 22 pts  Current status: progressing - FOTO 37 pts    Key Functional Changes:   Gains: slight reduction inLBP, no change in thoracic pain. Pain Best/Worst in LB: 4.5-8/10. Thoracic: 5-9/10  Deficits: limited sitting to 40 minutes prior to needing to change positions. Sleep disturbance at 2-3 hour intervals (unable to find position of comfort in neck and shoulders). Unable to drive due to limited C/S ROM. Updated Goals: to be achieved in 4 weeks:   Long term goals remain appropriate. ASSESSMENT/RECOMMENDATIONS:  [x]Continue therapy per initial plan/protocol at a frequency of  2 x per week for 4 weeks  [x]Continue therapy with the following recommended changes:_Pt would benefit from addition of aquatic therapy      _____________________________________________________________________  []Discontinue therapy progressing towards or have reached established goals  []Discontinue therapy due to lack of appreciable progress towards goals  []Discontinue therapy due to lack of attendance or compliance  [x]Await Physician's recommendations/decisions regarding therapy  []Other:________________________________________________________________    Thank you for this referral.   Rehan Aguilar, PT 9/21/2017 1:28 PM  NOTE TO PHYSICIAN:  [address] AND   [facility]: [phone]  If you are unable to process this request in 24 hours please contact our office: [phone]  []  I have read the above report and request that my patient continue as recommended.   []  I have read the above report and request that my patient continue therapy with the following changes/special instructions:________________________________________  []I have read the above report and request that my patient be discharged from therapy.     Physicians signature: ______________________________Date: ______Time:______

## 2017-09-22 ENCOUNTER — APPOINTMENT (OUTPATIENT)
Dept: PHYSICAL THERAPY | Age: 57
End: 2017-09-22
Payer: OTHER MISCELLANEOUS

## 2017-09-25 ENCOUNTER — HOSPITAL ENCOUNTER (OUTPATIENT)
Dept: LAB | Age: 57
Discharge: HOME | End: 2017-09-25
Payer: COMMERCIAL

## 2017-09-25 DIAGNOSIS — Z12.11 ENCOUNTER FOR FIT (FECAL IMMUNOCHEMICAL TEST) SCREENING: ICD-10-CM

## 2017-09-25 PROCEDURE — 82274 ASSAY TEST FOR BLOOD FECAL: CPT | Performed by: NURSE PRACTITIONER

## 2017-09-27 ENCOUNTER — HOSPITAL ENCOUNTER (OUTPATIENT)
Dept: PHYSICAL THERAPY | Age: 57
Discharge: HOME | End: 2017-09-27
Payer: OTHER MISCELLANEOUS

## 2017-09-27 PROCEDURE — 97110 THERAPEUTIC EXERCISES: CPT

## 2017-09-27 PROCEDURE — 97112 NEUROMUSCULAR REEDUCATION: CPT

## 2017-09-27 NOTE — PROGRESS NOTES
PT DAILY TREATMENT NOTE     Patient Name: Ct Rachel  Date:2017  : 1960  [x]  Patient  Verified  Payor: Gregoria Nieto / Plan: [id number] / Product Type: Workers Comp /    In Lizzie Hsieh time:500  Total Treatment Time (min): 40  Visit #: 9 of 10    Treatment Area: Bilateral shoulder pain [M25.511, M25.512]  Neck pain [M54.2]  Unsteadiness on feet [R26.81]  Back pain [M54.9]  Rib pain on left side [R07.81]    SUBJECTIVE  Pain Level (0-10 scale): 5  Any medication changes, allergies to medications, adverse drug reactions, diagnosis change, or new procedure performed?: [x] No    [] Yes (see summary sheet for update)  Subjective functional status/changes:   [] No changes reported  The pain is across the neck and shoulders and a little in the back. I go back to work on Monday.      OBJECTIVE    Modality rationale: decrease pain and increase tissue extensibility to improve the patients ability to improve activity tolerance   Min Type Additional Details    [] Estim:  []Unatt       []IFC  []Premod                        []Other:  []w/ice   []w/heat  Position:  Location:    [] Estim: []Att    []TENS instruct  []NMES                    []Other:  []w/US   []w/ice   []w/heat  Position:  Location:    []  Traction: [] Cervical       []Lumbar                       [] Prone          []Supine                       []Intermittent   []Continuous Lbs:  [] before manual  [] after manual    []  Ultrasound: []Continuous   [] Pulsed                           []1MHz   []3MHz W/cm2:  Location:    []  Iontophoresis with dexamethasone         Location: [] Take home patch   [] In clinic   8 []  Ice     [x]  heat  []  Ice massage  []  Laser   []  Anodyne Position:reclined  Location:back, neck    []  Laser with stim  []  Other:  Position:  Location:    []  Vasopneumatic Device Pressure:       [] lo [] med [] hi   Temperature: [] lo [] med [] hi   [] Skin assessment post-treatment:  []intact []redness- no adverse reaction    []redness  adverse reaction:     24 min Therapeutic Exercise:  [x] See flow sheet :   Rationale: increase ROM and increase strength to improve the patients ability to perform daily tasks    8 min Neuromuscular Re-education:  []  See flow sheet :   Rationale: increase strength and improve coordination  to improve the patients ability to improve cervical mobility     With   [] TE   [] TA   [] neuro   [] other: Patient Education: [x] Review HEP    [] Progressed/Changed HEP based on:   [] positioning   [] body mechanics   [] transfers   [] heat/ice application    [] other:      Other Objective/Functional Measures: completed exercises per flow sheet     Pain Level (0-10 scale) post treatment: 6.5    ASSESSMENT/Changes in Function: Patient states that next visit is her last as she is returning to work next week. Update HEP as needed at that time. Patient will continue to benefit from skilled PT services to modify and progress therapeutic interventions, address functional mobility deficits, address ROM deficits, address strength deficits, analyze and address soft tissue restrictions, analyze and cue movement patterns, analyze and modify body mechanics/ergonomics, assess and modify postural abnormalities, address imbalance/dizziness and instruct in home and community integration to attain remaining goals. []  See Plan of Care  []  See progress note/recertification  []  See Discharge Summary         Progress towards goals / Updated goals:  Goal: Pt to increase C/S AROM by at least 5 deg all planes to be able to return to driving.   Status at last note/ceritification: FF 30 deg, Ext 20 deg, SBR 30 deg, SBL 39 deg, RR 62 deg, RL 30 deg - all with pain  Current status: progressing - FF 35 deg, Ext 22 deg, SBR 25 deg, SBL 40 deg, RR 50 deg, RL 40 deg - all with pain  Goal: Pt to increase B Sh Scaption AROM to WNL without pain to be able to reach items overhead without difficulty. Status at last note/certification: 571 deg L, 130 deg R - with pain  Current status: progressing - 120 deg L, 130 deg R  Goal: Pt to improve L/S AROM to 75% of full all planes without increased pain for ease with dressing/bathing. Status at last note/certification: FF 97%, Ext 50%, SBR/SBL 25%, RR/RL 50% - all with pain  Current status: progressing - FF 50%, Ext 25%, SBR/SBL 75%, RR/RL 75% - all with pain   Goal: Pt to report 5/10 pain at worst to be able to sleep at night for better rest.  Status at last note/certification: 17/69 pain at worst  Current status: progressing - 8-8.5/10 pain at worst  Goal: Pt to report back FOTO score of 43 pts to show improved function.   Status at last note/certification: FOTO 22 pts  Current status: progressing - FOTO 37 pts    PLAN  [x]  Upgrade activities as tolerated     [x]  Continue plan of care  []  Update interventions per flow sheet       []  Discharge due to:_  []  Other:_      Kelly Grande, PT 9/27/2017  1:41 PM    Future Appointments  Date Time Provider Kirby Garrett   9/27/2017 4:30  [facility] Angelo [facility]   9/29/2017 4:00 PM Kristin Otoole, PT [facility]

## 2017-09-29 ENCOUNTER — HOSPITAL ENCOUNTER (OUTPATIENT)
Dept: PHYSICAL THERAPY | Age: 57
Discharge: HOME | End: 2017-09-29
Payer: OTHER MISCELLANEOUS

## 2017-09-29 LAB — HEMOCCULT STL QL IA: NEGATIVE

## 2017-09-29 PROCEDURE — 97112 NEUROMUSCULAR REEDUCATION: CPT

## 2017-09-29 PROCEDURE — 97110 THERAPEUTIC EXERCISES: CPT

## 2017-09-29 NOTE — PROGRESS NOTES
PT DAILY TREATMENT NOTE 3-16    Patient Name: Lester Rao  Date:2017  : 1960  [x]  Patient  Verified  Payor: Chon Oshea / Plan: [id number] / Product Type: Workers Comp /    In time:10:30  Out time:11:00  Total Treatment Time (min): 30  Visit #: 10 of 10    Treatment Area: Bilateral shoulder pain [M25.511, M25.512]  Neck pain [M54.2]  Unsteadiness on feet [R26.81]  Back pain [M54.9]  Rib pain on left side [R07.81]    SUBJECTIVE  Pain Level (0-10 scale): 5  Any medication changes, allergies to medications, adverse drug reactions, diagnosis change, or new procedure performed?: [x] No    [] Yes (see summary sheet for update)  Subjective functional status/changes:   [] No changes reported  \"I'm going back to work on Monday. \"    Patient reports 85% improvement since [facility]. Patient reports that stair negotiation and walking around the block is easier; however, reports that pain continues to wake her up at night.     OBJECTIVE  Modality rationale: PD   Min Type Additional Details    [] Estim:  []Unatt       []IFC  []Premod                        []Other:  []w/ice   []w/heat  Position:  Location:    [] Estim: []Att    []TENS instruct  []NMES                    []Other:  []w/US   []w/ice   []w/heat  Position:  Location:    []  Traction: [] Cervical       []Lumbar                       [] Prone          []Supine                       []Intermittent   []Continuous Lbs:  [] before manual  [] after manual    []  Ultrasound: []Continuous   [] Pulsed                           []1MHz   []3MHz Location:  W/cm2:    []  Iontophoresis with dexamethasone         Location: [] Take home patch   [] In clinic    []  Ice     []  heat  []  Ice massage  []  Laser   []  Anodyne Position:  Location:    []  Laser with stim  []  Other: Position:  Location:    []  Vasopneumatic Device Pressure:       [] lo [] med [] hi   Temperature: [] lo [] med [] hi   [] Skin assessment post-treatment: []intact []redness- no adverse reaction    []redness  adverse reaction:     22 min Therapeutic Exercise:  [x] See flow sheet :   Rationale: increase ROM, increase strength and improve coordination to improve the patients ability to increase ease with driving    8 min Neuromuscular Re-education:  [x]  See flow sheet : core stabilization activities, posture re-ed activities   Rationale: increase strength, improve coordination and increase proprioception  to improve the patients ability to increase ease with ADLs            With   [] TE   [] TA   [] neuro   [] other: Patient Education: [x] Review HEP    [] Progressed/Changed HEP based on:   [] positioning   [] body mechanics   [] transfers   [] heat/ice application    [] other:      Other Objective/Functional Measures:   L/s FOTO score: 45     Pain Level (0-10 scale) post treatment: 5    ASSESSMENT/Changes in Function:   Patient has been a pleasure to treat and is D/C today. Patient reports 85% improvement since [facility] with reports that stair negotiation and walking is easier. Patient does continues, however, to wake up at night due to pain. Patient is cleared to return to work on Monday. Patient will continue to benefit from skilled PT services to modify and progress therapeutic interventions, address functional mobility deficits, address ROM deficits, address strength deficits, analyze and address soft tissue restrictions, analyze and cue movement patterns, analyze and modify body mechanics/ergonomics and assess and modify postural abnormalities to attain remaining goals. []  See Plan of Care  []  See progress note/recertification  [x]  See Discharge Summary             Progress towards goals / Updated goals:  Goal: Pt to increase C/S AROM by at least 5 deg all planes to be able to return to driving.   Status at last note/ceritification: FF 30 deg, Ext 20 deg, SBR 30 deg, SBL 39 deg, RR 62 deg, RL 30 deg - all with pain  Current status: progressing - FF 35 deg, Ext 22 deg, SBR 25 deg, SBL 40 deg, RR 50 deg, RL 40 deg - all with pain  Goal: Pt to increase B Sh Scaption AROM to WNL without pain to be able to reach items overhead without difficulty. Status at last note/certification: 628 deg L, 130 deg R - with pain  Current status: progressing - 120 deg L, 130 deg R  Goal: Pt to improve L/S AROM to 75% of full all planes without increased pain for ease with dressing/bathing. Status at last note/certification: FF 86%, Ext 50%, SBR/SBL 25%, RR/RL 50% - all with pain  Current status: progressing - FF 50%, Ext 25%, SBR/SBL 75%, RR/RL 75% - all with pain   Goal: Pt to report 5/10 pain at worst to be able to sleep at night for better rest.  Status at last note/certification: 47/28 pain at worst  Current status: progressing - 8-8.5/10 pain at worst  Goal: Pt to report back FOTO score of 43 pts to show improved function. Status at last note/certification: FOTO 22 pts  Current status: met-FOTO score to 45     PLAN  []  Upgrade activities as tolerated     []  Continue plan of care  []  Update interventions per flow sheet       [x]  Discharge   []  Other:Rohith Sr 9/29/2017  10:30 AM    No future appointments.

## 2017-10-02 ENCOUNTER — PATIENT OUTREACH (OUTPATIENT)
Dept: FAMILY MEDICINE CLINIC | Age: 57
End: 2017-10-02

## 2017-10-02 NOTE — PROGRESS NOTES
[facility] [address]  [phone] fax    Discharge Summary    Patient name: Temitope Morejon Start of Care: 2017   Referral source: Mari Luna : 1960                          Medical Diagnosis: Bilateral shoulder pain [M25.511, M25.512]  Neck pain [M54.2]  Unsteadiness on feet [R26.81]  Back pain [M54.9]  Rib pain on left side [R07.81] Onset Date:17                          Treatment Diagnosis: Neck, Back, UE/LE pain   Prior Hospitalization: see medical history Provider#: [id number]   Medications: Verified on Patient summary List    Comorbidities: BMI > 30; HTN   Prior Level of Function: 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 Status at last note/ceritification: FF 30 deg, Ext 20 deg, SBR 30 deg, SBL 39 deg, RR 62 deg, RL 30 deg - all with pain  Current status: progressing - FF 35 deg, Ext 22 deg, SBR 25 deg, SBL 40 deg, RR 50 deg, RL 40 deg - all with pain  Goal: Pt to increase B Sh Scaption AROM to WNL without pain to be able to reach items overhead without difficulty. Status at last note/certification: 591 deg L, 130 deg R - with pain  Current status: progressing - 120 deg L, 130 deg R  Goal: Pt to improve L/S AROM to 75% of full all planes without increased pain for ease with dressing/bathing.   Status at last note/certification: FF 64%, Ext 50%, SBR/SBL 25%, RR/RL 50% - all with pain  Current status: progressing - FF 50%, Ext 25%, SBR/SBL 75%, RR/RL 75% - all with pain   Goal: Pt to report 5/10 pain at worst to be able to sleep at night for better rest.  Status at last note/certification: 42/ pain at worst  Current status: progressing - 8-8.5/10 pain at worst  Goal: Pt to report back FOTO score of 43 pts to show improved function. Status at last note/certification: FOTO 22 pts  Current status: met-FOTO score to 45       Assessment/ Summary of Care: Patient has consistently attended therapy for neck, back and extremity pain following a work injury. At this time she demonstrates improved strength and range of motion, and reports 85% improvement. Returning to work early in October. Due to good improvement and ability to continue on her own, Patient will be discharged at this time. Should she require further treatment in the future we would be pleased to treat her. Thank you for the referral of this Patient.      RECOMMENDATIONS:  [x]Discontinue therapy: [x]Patient has reached or is progressing toward set goals      []Patient is non-compliant or has abdicated      []Due to lack of appreciable progress towards set [facility], PT 10/2/2017 7:20 AM

## 2017-10-02 NOTE — PROGRESS NOTES
[facility] health screening:    Noted in [facility] Ms Manfred Gallardo is negative. Will continue to follow until completion of cervical cancer screening which may be a while until her back pain subsides.

## 2017-11-20 DIAGNOSIS — I10 ESSENTIAL HYPERTENSION WITH GOAL BLOOD PRESSURE LESS THAN 140/90: ICD-10-CM

## 2017-11-20 NOTE — TELEPHONE ENCOUNTER
Requested Prescriptions     Pending Prescriptions Disp Refills    lisinopril-hydroCHLOROthiazide (PRINZIDE, ZESTORETIC) 20-12.5 mg per tablet 90 Tab 0     Sig: Take 1 Tab by mouth daily.

## 2017-11-21 RX ORDER — LISINOPRIL AND HYDROCHLOROTHIAZIDE 12.5; 2 MG/1; MG/1
1 TABLET ORAL DAILY
Qty: 90 TAB | Refills: 0 | OUTPATIENT
Start: 2017-11-21

## 2017-11-27 ENCOUNTER — TELEPHONE (OUTPATIENT)
Dept: FAMILY MEDICINE CLINIC | Age: 57
End: 2017-11-27

## 2017-11-27 NOTE — TELEPHONE ENCOUNTER
Patient was informed that she need to schedule an appointment and refills could be done at that time. MsAndrew Jean Webb said she will call back to schedule her follow up appointment after checking her schedule.

## 2017-12-01 ENCOUNTER — PATIENT OUTREACH (OUTPATIENT)
Dept: FAMILY MEDICINE CLINIC | Age: 57
End: 2017-12-01

## 2017-12-01 NOTE — PROGRESS NOTES
health screening:    Still has not completed her cervical cancer screening. Ms Verónica Willis shared with me that she plans to call Dr. Gelacio Barone @ her new location to set up an appointment with a physician @ [facility] in [facility] and she will have her screening done there. I discussed the importance of renewing her BP medications as she has \"totally run out and hasn't had for a few days\". Encouraged her to call [facility] and obtain a few doses to hold her over until she can get in to see her new pcp. She plans \"to do just that\". Discussed what happens in the body with just a few missing doses and she \"promises to get some medication and call for an appointment today\".

## 2017-12-27 ENCOUNTER — PATIENT OUTREACH (OUTPATIENT)
Dept: FAMILY MEDICINE CLINIC | Age: 57
End: 2017-12-27

## 2018-01-05 NOTE — PROGRESS NOTES
NN health screening:    Up to date on breast cancer and colon cancer screening. Will consider her lost of f/u regarding her cervical cancer screening as she has not made the appointment to complete this important screening as promised. Closed episode of care. - likely toxic metabolic encephalopathy vs. vascular dementia; however pt also has R facial droop and R blown pupil  - She was reportedly able to converse normally prior to last admission  - CT head with no clear signs of CVA     #: Opacification of the right tympanomastoid cavity which may be secondary to an effusion or inflammatory related soft tissue seen on CT  - Reviewed ENT note; No need for steroids in palsy of this duration.  - CTIAC d/c'ed given it would be unlikely to change pts. outlook

## 2018-04-17 ENCOUNTER — HOSPITAL ENCOUNTER (OUTPATIENT)
Dept: LAB | Age: 58
Discharge: HOME | End: 2018-04-17
Payer: COMMERCIAL

## 2018-04-17 ENCOUNTER — OFFICE VISIT (OUTPATIENT)
Dept: FAMILY MEDICINE CLINIC | Age: 58
End: 2018-04-17

## 2018-04-17 VITALS
OXYGEN SATURATION: 96 % | HEART RATE: 73 BPM | RESPIRATION RATE: 18 BRPM | BODY MASS INDEX: 34.06 KG/M2 | SYSTOLIC BLOOD PRESSURE: 160 MMHG | TEMPERATURE: 97.5 F | HEIGHT: 67 IN | WEIGHT: 217 LBS | DIASTOLIC BLOOD PRESSURE: 100 MMHG

## 2018-04-17 DIAGNOSIS — L30.9 ECZEMA, UNSPECIFIED TYPE: ICD-10-CM

## 2018-04-17 DIAGNOSIS — R05.9 COUGH: ICD-10-CM

## 2018-04-17 DIAGNOSIS — I10 ESSENTIAL HYPERTENSION WITH GOAL BLOOD PRESSURE LESS THAN 140/90: ICD-10-CM

## 2018-04-17 DIAGNOSIS — G89.29 CHRONIC BILATERAL LOW BACK PAIN WITHOUT SCIATICA: ICD-10-CM

## 2018-04-17 DIAGNOSIS — I10 ESSENTIAL HYPERTENSION WITH GOAL BLOOD PRESSURE LESS THAN 140/90: Primary | ICD-10-CM

## 2018-04-17 DIAGNOSIS — M54.50 CHRONIC BILATERAL LOW BACK PAIN WITHOUT SCIATICA: ICD-10-CM

## 2018-04-17 DIAGNOSIS — L29.9 ITCHING: ICD-10-CM

## 2018-04-17 LAB
ALBUMIN SERPL-MCNC: 3.5 G/DL (ref 3.4–5)
ALBUMIN/GLOB SERPL: 0.9 {RATIO} (ref 0.8–1.7)
ALP SERPL-CCNC: 100 U/L (ref 45–117)
ALT SERPL-CCNC: 13 U/L (ref 13–56)
ANION GAP SERPL CALC-SCNC: 5 MMOL/L (ref 3–18)
AST SERPL-CCNC: 11 U/L (ref 15–37)
BILIRUB SERPL-MCNC: 0.4 MG/DL (ref 0.2–1)
BUN SERPL-MCNC: 9 MG/DL (ref 7–18)
BUN/CREAT SERPL: 11 (ref 12–20)
CALCIUM SERPL-MCNC: 9.3 MG/DL (ref 8.5–10.1)
CHLORIDE SERPL-SCNC: 107 MMOL/L (ref 100–108)
CHOLEST SERPL-MCNC: 212 MG/DL
CO2 SERPL-SCNC: 30 MMOL/L (ref 21–32)
CREAT SERPL-MCNC: 0.79 MG/DL (ref 0.6–1.3)
GLOBULIN SER CALC-MCNC: 3.8 G/DL (ref 2–4)
GLUCOSE SERPL-MCNC: 79 MG/DL (ref 74–99)
HDLC SERPL-MCNC: 53 MG/DL (ref 40–60)
HDLC SERPL: 4 {RATIO} (ref 0–5)
LDLC SERPL CALC-MCNC: 136 MG/DL (ref 0–100)
LIPID PROFILE,FLP: ABNORMAL
POTASSIUM SERPL-SCNC: 4.3 MMOL/L (ref 3.5–5.5)
PROT SERPL-MCNC: 7.3 G/DL (ref 6.4–8.2)
SODIUM SERPL-SCNC: 142 MMOL/L (ref 136–145)
TRIGL SERPL-MCNC: 115 MG/DL (ref ?–150)
VLDLC SERPL CALC-MCNC: 23 MG/DL

## 2018-04-17 PROCEDURE — 80061 LIPID PANEL: CPT | Performed by: FAMILY MEDICINE

## 2018-04-17 PROCEDURE — 36415 COLL VENOUS BLD VENIPUNCTURE: CPT | Performed by: FAMILY MEDICINE

## 2018-04-17 PROCEDURE — 80053 COMPREHEN METABOLIC PANEL: CPT | Performed by: FAMILY MEDICINE

## 2018-04-17 RX ORDER — HYDROXYZINE 50 MG/1
50 TABLET, FILM COATED ORAL
Qty: 30 TAB | Refills: 3 | Status: SHIPPED | OUTPATIENT
Start: 2018-04-17 | End: 2018-05-17

## 2018-04-17 RX ORDER — TRAMADOL HYDROCHLORIDE 50 MG/1
50 TABLET ORAL
Qty: 30 TAB | Refills: 0 | Status: SHIPPED | OUTPATIENT
Start: 2018-04-17

## 2018-04-17 RX ORDER — CLOBETASOL PROPIONATE 0.5 MG/G
OINTMENT TOPICAL 2 TIMES DAILY
Qty: 15 G | Refills: 0 | Status: SHIPPED | OUTPATIENT
Start: 2018-04-17

## 2018-04-17 RX ORDER — LISINOPRIL AND HYDROCHLOROTHIAZIDE 12.5; 2 MG/1; MG/1
1 TABLET ORAL DAILY
Qty: 90 TAB | Refills: 0 | Status: SHIPPED | OUTPATIENT
Start: 2018-04-17 | End: 2018-07-27 | Stop reason: SDUPTHER

## 2018-04-17 NOTE — PROGRESS NOTES
Hypertension Follow Up Progress Note      Today's Date:  2018   Patient's Name: Milagros Gonzales   Patient's :  1960     Subjective:     Milagros Gonzales is a 59 y.o. female who presents for follow up of hypertension. Diet and Lifestyle: generally follows a low sodium diet, exercises sporadically, nonsmoker, walks 1/2 mile weekly  Home BP Monitoring: is not well controlled at home, ranging 140's/80-90's    Cardiovascular ROS: She has been out of her medications for a few weeks, no medication side effects noted, no TIA's, no chest pain on exertion, no dyspnea on exertion, no swelling of ankles. New concerns: Cough, rattle chest tightness since Saturday. No fevers. Hx of eczema with itching- using clobetasol and atarax for itching which is effective for her. She has hx of chronic bilateral low back pain without sciatica. Using tramadol per previous PCP    Review of Systems:   Gen- No weight loss, No Malaise, No fatigue  Eyes- No dipoplia, blurry vision  CVS- No Chest pain, no palpitations, no edema  Resp- No Cough, SOB, MANRIQUEZ, Wheezing  Neuro- No headaches  Skin- No easy bruising, No rashes      BUN   Date Value Ref Range Status   2017 13 7.0 - 18 MG/DL Final     Creatinine   Date Value Ref Range Status   2017 0.88 0.6 - 1.3 MG/DL Final     GFR est AA   Date Value Ref Range Status   2017 >60 >60 ml/min/1.73m2 Final     BUN   Date Value Ref Range Status   2017 13 7.0 - 18 MG/DL Final     Creatinine   Date Value Ref Range Status   2017 0.88 0.6 - 1.3 MG/DL Final     GFR est AA   Date Value Ref Range Status   2017 >60 >60 ml/min/1.73m2 Final       Objective:     Visit Vitals    BP (!) 163/116    Pulse 73    Temp 97.5 °F (36.4 °C) (Oral)    Resp 18    Ht 5' 7\" (1.702 m)    Wt 217 lb (98.4 kg)    SpO2 96%    BMI 33.99 kg/m2     Appearance: alert, well appearing, and in no distress and oriented to person, place, and time.   ENT- + nasal congestion, Excess mucus in posterior oropharynx  Cardiovascular: Normal rate and regular rhythm, no gallop, no murmur., S1, S2 normal and no JVD   Pulmonary/Chest: Effort normal and breath sounds normal. No respiratory distress. No wheezes or rales  Lower Extremities (feet): warm, good capillary refill  Lab review: orders written for new lab studies as appropriate; see orders. CVS exam BP noted to be poorly controlled today in office,    Assessment/Plan:     lab results and schedule of future lab studies reviewed with patient. hypertension poorly controlled. encouraged medication compliance. ICD-10-CM ICD-9-CM    1. Essential hypertension with goal blood pressure less than 140/90 I10 401.9 lisinopril-hydroCHLOROthiazide (PRINZIDE, ZESTORETIC) 20-12.5 mg per tablet      METABOLIC PANEL, COMPREHENSIVE      LIPID PANEL   2. Cough R05 786.2    3. Chronic bilateral low back pain without sciatica M54.5 724.2 traMADol (ULTRAM) 50 mg tablet    G89.29 338.29    4. Itching L29.9 698.9 hydrOXYzine HCl (ATARAX) 50 mg tablet   5. Eczema, unspecified type L30.9 692.9 clobetasol (TEMOVATE) 0.05 % ointment     1. HTN- uncontrolled. Restart prinzide. Recheck BP in 10 days. Needs updated labs    2. Cough- secondary to postnasal drip. Advised nasal decongestants    3. Refill tramadol    4. Refill atarax    5. Refill clobetasol    Recommendations:  Limit sodium < 1500 mg/day  DASH diet  Wt reduction and maintenance  Exercise- 30 min 5 days/wk with intense 20 min 3 days/wk  Moderation of Alcohol intake: 1 serving/day, 5/wk -female, 2 servings/day  9/wk-male  Do not smoke  Avoid NSAIDs, pseudoephedrine, phenylephrine and herbal suppliments such as bitter orange, ginsing, abeba's wort, licorice, caffeine pills. Discussed with patient at length the need for blood pressure re-evaluation and management with primary care. Discussed the long term sequelae for elevated blood pressure including blindness, CVA, MI, and renal failure/ dialysis. Pt agrees to follow up as directed.      Jose R Fritz MD

## 2018-04-17 NOTE — MR AVS SNAPSHOT
[address] [facility] [address] [phone] Patient: Belen Wilcox MRN: [id number] [facility] [place]:8/89/5393 Visit Information Date & Time Provider Department Dept. Phone Encounter #  
 [facility]17/2018 [phone] [id number] Follow-up Instructions Return in about 10 days (around 4/27/2018) for Nurse visit- blood pressure check. Upcoming Health Maintenance Date Due Hepatitis C Screening 1960 PAP AKA CERVICAL CYTOLOGY 1/31/2017 Influenza Age 5 to Adult 8/1/2017 FOBT Q 1 YEAR AGE 50-75 9/25/2018 BREAST CANCER SCRN MAMMOGRAM 6/28/2019 DTaP/Tdap/Td series (2 - Td) 7/14/2027 Allergies as of 4/17/2018  Review Complete On: 4/17/2018 By: Higinio Jett No Known Allergies Current Immunizations  Never Reviewed Name Date Tdap 7/14/2017 12:55 PM  
  
 Not reviewed this visit You Were Diagnosed With   
  
 Codes Comments Essential hypertension with goal blood pressure less than 140/90    -  Primary ICD-10-CM: I10 
ICD-9-CM: 401.9 Chronic bilateral low back pain without sciatica     ICD-10-CM: M54.5, G89.29 ICD-9-CM: 724.2, 338.29 Itching     ICD-10-CM: L29.9 ICD-9-CM: 698.9 Eczema, unspecified type     ICD-10-CM: L30.9 ICD-9-CM: 692.9 Vitals BP Pulse Temp Resp Height(growth percentile) Weight(growth percentile) (!) 160/100 73 97.5 °F (36.4 °C) (Oral) 18 5' 7\" (1.702 m) 217 lb (98.4 kg) SpO2 BMI OB Status Smoking Status 96% 33.99 kg/m2 Ablation Never Smoker Vitals History BMI and BSA Data Body Mass Index Body Surface Area  
 33.99 kg/m 2 2.16 m 2 Preferred Pharmacy Pharmacy Name Phone [address] [place] [address]. [phone] Your Updated Medication List  
  
   
This list is accurate as of 4/17/18 10:36 AM.  Always use your most recent med list.  
  
  
  
  
 clobetasol 0.05 % ointment Commonly known as:  Anice Plough Apply  to affected area two (2) times a day. ferrous sulfate 324 mg (65 mg iron) tablet Take  by mouth. hydrOXYzine HCl 50 mg tablet Commonly known as:  ATARAX Take 1 Tab by mouth nightly as needed for Itching for up to 30 days. ibuprofen 800 mg tablet Commonly known as:  MOTRIN Take 1 Tab by mouth every eight (8) hours as needed for Pain. lisinopril-hydroCHLOROthiazide 20-12.5 mg per tablet Commonly known as:  Marlaine Maurice Take 1 Tab by mouth daily. naproxen 500 mg tablet Commonly known as:  NAPROSYN Take 1 Tab by mouth two (2) times daily (with meals). * predniSONE 20 mg tablet Commonly known as:  Denise Wayne Take 1 Tab by mouth daily (with breakfast). * predniSONE 20 mg tablet Commonly known as:  Denise Wayne Take 1 Tab by mouth daily (with breakfast). traMADol 50 mg tablet Commonly known as:  ULTRAM  
Take 1 Tab by mouth every eight (8) hours as needed for Pain. Max Daily Amount: 150 mg.  
  
 * Notice: This list has 2 medication(s) that are the same as other medications prescribed for you. Read the directions carefully, and ask your doctor or other care provider to review them with you. Prescriptions Printed Refills  
 traMADol (ULTRAM) 50 mg tablet 0 Sig: Take 1 Tab by mouth every eight (8) hours as needed for Pain. Max Daily Amount: 150 mg.  
 Class: Print Route: Oral  
  
Prescriptions Sent to Pharmacy Refills  
 lisinopril-hydroCHLOROthiazide (PRINZIDE, ZESTORETIC) 20-12.5 mg per tablet 0 Sig: Take 1 Tab by mouth daily. Class: Normal  
 Pharmacy: [facility] [address] [address]. Ph #: [phone] Route: Oral  
 hydrOXYzine HCl (ATARAX) 50 mg tablet 3 Sig: Take 1 Tab by mouth nightly as needed for Itching for up to 30 days.   
 Class: Normal  
 Pharmacy: [facility] [address] [address] [facility] Ph #: [phone] Route: Oral  
 clobetasol (TEMOVATE) 0.05 % ointment 0 Sig: Apply  to affected area two (2) times a day. Class: Normal  
 Pharmacy: [facility] [address] [facility]. Ph #: [phone] Route: Topical  
  
Follow-up Instructions Return in about 10 days (around 4/27/2018) for Nurse visit- blood pressure check. To-Do List   
 04/17/2018 Lab:  LIPID PANEL   
  
 04/17/2018 Lab:  METABOLIC PANEL, COMPREHENSIVE Patient Instructions Atopic Dermatitis: Care Instructions Your Care Instructions Atopic dermatitis (also called eczema) is a skin problem that causes intense itching and a red, raised rash. In severe cases, the rash develops clear fluid-filled blisters. The rash is not contagious. People with this condition seem to have very sensitive immune systems that are likely to react to things that cause allergies. The immune system is the body's way of fighting infection. There is no cure for atopic dermatitis, but you may be able to control it with care at home. Follow-up care is a key part of your treatment and safety. Be sure to make and go to all appointments, and call your doctor if you are having problems. It's also a good idea to know your test results and keep a list of the medicines you take. How can you care for yourself at home? · Use moisturizer at least twice a day. · If your doctor prescribes a cream, use it as directed. If your doctor prescribes other medicine, take it exactly as directed. · Wash the affected area with water only. Soap can make dryness and itching worse. Pat dry. · Apply a moisturizer after bathing. Use a cream such as Lubriderm, Moisturel, or Cetaphil that does not irritate the skin or cause a rash. Apply the cream while your skin is still damp after lightly drying with a towel. · Use cold, wet cloths to reduce itching. · Keep cool, and stay out of the sun. · If itching affects your normal activities, an over-the-counter antihistamine, such as diphenhydramine (Benadryl) or loratadine (Claritin) may help. Read and follow all instructions on the label. When should you call for help? Call your doctor now or seek immediate medical care if: 
? · Your rash gets worse and you have a fever. ? · You have new blisters or bruises, or the rash spreads and looks like a sunburn. ? · You have signs of infection, such as: 
¨ Increased pain, swelling, warmth, or redness. ¨ Red streaks leading from the rash. ¨ Pus draining from the rash. ¨ A fever. ? · You have crusting or oozing sores. ? · You have joint aches or body aches along with your rash. ? Watch closely for changes in your health, and be sure to contact your doctor if: 
? · Your rash does not clear up after 2 to 3 weeks of home treatment. ? · Itching interferes with your sleep or daily activities. Where can you learn more? Go to http://toya-juliet.info/. Enter D089 in the search box to learn more about \"Atopic Dermatitis: Care Instructions. \" Current as of: October 13, 2016 Content Version: 11.4 © 5412-2417 [employer]. Care instructions adapted under license by [employer] (which disclaims liability or warranty for this information). If you have questions about a medical condition or this instruction, always ask your healthcare professional. [address] [address] any warranty or liability for your use of this information. DASH Diet: Care Instructions Your Care Instructions The DASH diet is an eating plan that can help lower your blood pressure. DASH stands for Dietary Approaches to Stop Hypertension. Hypertension is high blood pressure. The DASH diet focuses on eating foods that are high in calcium, potassium, and magnesium. These nutrients can lower blood pressure.  The foods that are highest in these nutrients are fruits, vegetables, low-fat dairy products, nuts, seeds, and legumes. But taking calcium, potassium, and magnesium supplements instead of eating foods that are high in those nutrients does not have the same effect. The DASH diet also includes whole grains, fish, and poultry. The DASH diet is one of several lifestyle changes your doctor may recommend to lower your high blood pressure. Your doctor may also want you to decrease the amount of sodium in your diet. Lowering sodium while following the DASH diet can lower blood pressure even further than just the DASH diet alone. Follow-up care is a key part of your treatment and safety. Be sure to make and go to all appointments, and call your doctor if you are having problems. It's also a good idea to know your test results and keep a list of the medicines you take. How can you care for yourself at home? Following the DASH diet · Eat 4 to 5 servings of fruit each day. A serving is 1 medium-sized piece of fruit, ½ cup chopped or canned fruit, 1/4 cup dried fruit, or 4 ounces (½ cup) of fruit juice. Choose fruit more often than fruit juice. · Eat 4 to 5 servings of vegetables each day. A serving is 1 cup of lettuce or raw leafy vegetables, ½ cup of chopped or cooked vegetables, or 4 ounces (½ cup) of vegetable juice. Choose vegetables more often than vegetable juice. · Get 2 to 3 servings of low-fat and fat-free dairy each day. A serving is 8 ounces of milk, 1 cup of yogurt, or 1 ½ ounces of cheese. · Eat 6 to 8 servings of grains each day. A serving is 1 slice of bread, 1 ounce of dry cereal, or ½ cup of cooked rice, pasta, or cooked cereal. Try to choose whole-grain products as much as possible. · Limit lean meat, poultry, and fish to 2 servings each day. A serving is 3 ounces, about the size of a deck of cards.  
· Eat 4 to 5 servings of nuts, seeds, and legumes (cooked dried beans, lentils, and split peas) each week. A serving is 1/3 cup of nuts, 2 tablespoons of seeds, or ½ cup of cooked beans or peas. · Limit fats and oils to 2 to 3 servings each day. A serving is 1 teaspoon of vegetable oil or 2 tablespoons of salad dressing. · Limit sweets and added sugars to 5 servings or less a week. A serving is 1 tablespoon jelly or jam, ½ cup sorbet, or 1 cup of lemonade. · Eat less than 2,300 milligrams (mg) of sodium a day. If you limit your sodium to 1,500 mg a day, you can lower your blood pressure even more. Tips for success · Start small. Do not try to make dramatic changes to your diet all at once. You might feel that you are missing out on your favorite foods and then be more likely to not follow the plan. Make small changes, and stick with them. Once those changes become habit, add a few more changes. · Try some of the following: ¨ Make it a goal to eat a fruit or vegetable at every meal and at snacks. This will make it easy to get the recommended amount of fruits and vegetables each day. ¨ Try yogurt topped with fruit and nuts for a snack or healthy dessert. ¨ Add lettuce, tomato, cucumber, and onion to sandwiches. ¨ Combine a ready-made pizza crust with low-fat mozzarella cheese and lots of vegetable toppings. Try using tomatoes, squash, spinach, broccoli, carrots, cauliflower, and onions. ¨ Have a variety of cut-up vegetables with a low-fat dip as an appetizer instead of chips and dip. ¨ Sprinkle sunflower seeds or chopped almonds over salads. Or try adding chopped walnuts or almonds to cooked vegetables. ¨ Try some vegetarian meals using beans and peas. Add garbanzo or kidney beans to salads. Make burritos and tacos with mashed rogers beans or black beans. Where can you learn more? Go to http://toya-juliet.info/. Enter E986 in the search box to learn more about \"DASH Diet: Care Instructions. \" Current as of: September 21, 2016 Content Version: 11.4 © 4980-2157 [employer]wise, [employer]. Care instructions adapted under license by [employer] (which disclaims liability or warranty for this information). If you have questions about a medical condition or this instruction, always ask your healthcare professional. [address] any warranty or liability for your use of this information. High Blood Pressure: Care Instructions Your Care Instructions If your blood pressure is usually above 140/90, you have high blood pressure, or hypertension. That means the top number is 140 or higher or the bottom number is 90 or higher, or both. Despite what a lot of people think, high blood pressure usually doesn't cause headaches or make you feel dizzy or lightheaded. It usually has no symptoms. But it does increase your risk for heart attack, stroke, and kidney or eye damage. The higher your blood pressure, the more your risk increases. Your doctor will give you a goal for your blood pressure. Your goal will be based on your health and your age. An example of a goal is to keep your blood pressure below 140/90. Lifestyle changes, such as eating healthy and being active, are always important to help lower blood pressure. You might also take medicine to reach your blood pressure goal. 
Follow-up care is a key part of your treatment and safety. Be sure to make and go to all appointments, and call your doctor if you are having problems. It's also a good idea to know your test results and keep a list of the medicines you take. How can you care for yourself at home? Medical treatment · If you stop taking your medicine, your blood pressure will go back up. You may take one or more types of medicine to lower your blood pressure. Be safe with medicines. Take your medicine exactly as prescribed. Call your doctor if you think you are having a problem with your medicine. · Talk to your doctor before you start taking aspirin every day.  Aspirin can help certain people lower their risk of a heart attack or stroke. But taking aspirin isn't right for everyone, because it can cause serious bleeding. · See your doctor regularly. You may need to see the doctor more often at first or until your blood pressure comes down. · If you are taking blood pressure medicine, talk to your doctor before you take decongestants or anti-inflammatory medicine, such as ibuprofen. Some of these medicines can raise blood pressure. · Learn how to check your blood pressure at home. Lifestyle changes · Stay at a healthy weight. This is especially important if you put on weight around the waist. Losing even 10 pounds can help you lower your blood pressure. · If your doctor recommends it, get more exercise. Walking is a good choice. Bit by bit, increase the amount you walk every day. Try for at least 30 minutes on most days of the week. You also may want to swim, bike, or do other activities. · Avoid or limit alcohol. Talk to your doctor about whether you can drink any alcohol. · Try to limit how much sodium you eat to less than 2,300 milligrams (mg) a day. Your doctor may ask you to try to eat less than 1,500 mg a day. · Eat plenty of fruits (such as bananas and oranges), vegetables, legumes, whole grains, and low-fat dairy products. · Lower the amount of saturated fat in your diet. Saturated fat is found in animal products such as milk, cheese, and meat. Limiting these foods may help you lose weight and also lower your risk for heart disease. · Do not smoke. Smoking increases your risk for heart attack and stroke. If you need help quitting, talk to your doctor about stop-smoking programs and medicines. These can increase your chances of quitting for good. When should you call for help? Call 911 anytime you think you may need emergency care.  This may mean having symptoms that suggest that your blood pressure is causing a serious heart or blood vessel problem. Your blood pressure may be over 180/110. ? For example, call 911 if: 
? · You have symptoms of a heart attack. These may include: ¨ Chest pain or pressure, or a strange feeling in the chest. 
¨ Sweating. ¨ Shortness of breath. ¨ Nausea or vomiting. ¨ Pain, pressure, or a strange feeling in the back, neck, jaw, or upper belly or in one or both shoulders or arms. ¨ Lightheadedness or sudden weakness. ¨ A fast or irregular heartbeat. ? · You have symptoms of a stroke. These may include: 
¨ Sudden numbness, tingling, weakness, or loss of movement in your face, arm, or leg, especially on only one side of your body. ¨ Sudden vision changes. ¨ Sudden trouble speaking. ¨ Sudden confusion or trouble understanding simple statements. ¨ Sudden problems with walking or balance. ¨ A sudden, severe headache that is different from past headaches. ? · You have severe back or belly pain. ?Do not wait until your blood pressure comes down on its own. Get help right away. ?Call your doctor now or seek immediate care if: 
? · Your blood pressure is much higher than normal (such as 180/110 or higher), but you don't have symptoms. ? · You think high blood pressure is causing symptoms, such as: ¨ Severe headache. ¨ Blurry vision. ? Watch closely for changes in your health, and be sure to contact your doctor if: 
? · Your blood pressure measures 140/90 or higher at least 2 times. That means the top number is 140 or higher or the bottom number is 90 or higher, or both. ? · You think you may be having side effects from your blood pressure medicine. ? · Your blood pressure is usually normal, but it goes above normal at least 2 times. Where can you learn more? Go to http://toya-juliet.info/. Enter C350 in the search box to learn more about \"High Blood Pressure: Care Instructions. \" Current as of: September 21, 2016 Content Version: 11.4 © 1082-4771 Healthwise, [employer]. Care instructions adapted under license by [employer] (which disclaims liability or warranty for this information). If you have questions about a medical condition or this instruction, always ask your healthcare professional. [address] any warranty or liability for your use of this information. Introducing [facility]! Dear Chilo Sarkar: 
Thank you for requesting a myhub account. Our records indicate that you already have an active myhub account. You can access your account anytime at [email or web address] Did you know that you can access your hospital and ER discharge instructions at any time in myhub? You can also review all of your test results from your hospital stay or ER visit. Additional Information If you have questions, please visit the Frequently Asked Questions section of the myhub website at [email or web address] Remember, myhub is NOT to be used for urgent needs. For medical emergencies, dial 911. Now available from your iPhone and Android! Please provide this summary of care documentation to your next provider. Your primary care clinician is listed as Kacie Aguirre. If you have any questions after today's visit, please call [phone].

## 2018-04-17 NOTE — LETTER
NOTIFICATION RETURN TO WORK / SCHOOL 
 
4/17/2018 10:31 AM 
 
Ms. Sergo Knapp [address] [place] [id number] To Whom It May Concern: 
 
Sergo Knapp is currently under the care of [facility]. She will return to work/school on: 4/18/2018 If there are questions or concerns please have the patient contact our office.  
 
 
 
Sincerely, 
 
 
Emile Garcia MD

## 2018-04-17 NOTE — PATIENT INSTRUCTIONS
Atopic Dermatitis: Care Instructions  Your Care Instructions  Atopic dermatitis (also called eczema) is a skin problem that causes intense itching and a red, raised rash. In severe cases, the rash develops clear fluid-filled blisters. The rash is not contagious. People with this condition seem to have very sensitive immune systems that are likely to react to things that cause allergies. The immune system is the body's way of fighting infection. There is no cure for atopic dermatitis, but you may be able to control it with care at home. Follow-up care is a key part of your treatment and safety. Be sure to make and go to all appointments, and call your doctor if you are having problems. It's also a good idea to know your test results and keep a list of the medicines you take. How can you care for yourself at home? · Use moisturizer at least twice a day. · If your doctor prescribes a cream, use it as directed. If your doctor prescribes other medicine, take it exactly as directed. · Wash the affected area with water only. Soap can make dryness and itching worse. Pat dry. · Apply a moisturizer after bathing. Use a cream such as Lubriderm, Moisturel, or Cetaphil that does not irritate the skin or cause a rash. Apply the cream while your skin is still damp after lightly drying with a towel. · Use cold, wet cloths to reduce itching. · Keep cool, and stay out of the sun. · If itching affects your normal activities, an over-the-counter antihistamine, such as diphenhydramine (Benadryl) or loratadine (Claritin) may help. Read and follow all instructions on the label. When should you call for help? Call your doctor now or seek immediate medical care if:  ? · Your rash gets worse and you have a fever. ? · You have new blisters or bruises, or the rash spreads and looks like a sunburn. ? · You have signs of infection, such as:  ¨ Increased pain, swelling, warmth, or redness.   ¨ Red streaks leading from the rash.  ¨ Pus draining from the rash. ¨ A fever. ? · You have crusting or oozing sores. ? · You have joint aches or body aches along with your rash. ? Watch closely for changes in your health, and be sure to contact your doctor if:  ? · Your rash does not clear up after 2 to 3 weeks of home treatment. ? · Itching interferes with your sleep or daily activities. Where can you learn more? Go to http://toya-juliet.info/. Enter Z713 in the search box to learn more about \"Atopic Dermatitis: Care Instructions. \"  Current as of: October 13, 2016  Content Version: 11.4  © 4129-9241 [employer]. Care instructions adapted under license by [email or web address] (which disclaims liability or warranty for this information). If you have questions about a medical condition or this instruction, always ask your healthcare professional. [place] [address] any warranty or liability for your use of this information. DASH Diet: Care Instructions  Your Care Instructions    The DASH diet is an eating plan that can help lower your blood pressure. DASH stands for Dietary Approaches to Stop Hypertension. Hypertension is high blood pressure. The DASH diet focuses on eating foods that are high in calcium, potassium, and magnesium. These nutrients can lower blood pressure. The foods that are highest in these nutrients are fruits, vegetables, low-fat dairy products, nuts, seeds, and legumes. But taking calcium, potassium, and magnesium supplements instead of eating foods that are high in those nutrients does not have the same effect. The DASH diet also includes whole grains, fish, and poultry. The DASH diet is one of several lifestyle changes your doctor may recommend to lower your high blood pressure. Your doctor may also want you to decrease the amount of sodium in your diet.  Lowering sodium while following the DASH diet can lower blood pressure even further than just the DASH diet alone. Follow-up care is a key part of your treatment and safety. Be sure to make and go to all appointments, and call your doctor if you are having problems. It's also a good idea to know your test results and keep a list of the medicines you take. How can you care for yourself at home? Following the DASH diet  · Eat 4 to 5 servings of fruit each day. A serving is 1 medium-sized piece of fruit, ½ cup chopped or canned fruit, 1/4 cup dried fruit, or 4 ounces (½ cup) of fruit juice. Choose fruit more often than fruit juice. · Eat 4 to 5 servings of vegetables each day. A serving is 1 cup of lettuce or raw leafy vegetables, ½ cup of chopped or cooked vegetables, or 4 ounces (½ cup) of vegetable juice. Choose vegetables more often than vegetable juice. · Get 2 to 3 servings of low-fat and fat-free dairy each day. A serving is 8 ounces of milk, 1 cup of yogurt, or 1 ½ ounces of cheese. · Eat 6 to 8 servings of grains each day. A serving is 1 slice of bread, 1 ounce of dry cereal, or ½ cup of cooked rice, pasta, or cooked cereal. Try to choose whole-grain products as much as possible. · Limit lean meat, poultry, and fish to 2 servings each day. A serving is 3 ounces, about the size of a deck of cards. · Eat 4 to 5 servings of nuts, seeds, and legumes (cooked dried beans, lentils, and split peas) each week. A serving is 1/3 cup of nuts, 2 tablespoons of seeds, or ½ cup of cooked beans or peas. · Limit fats and oils to 2 to 3 servings each day. A serving is 1 teaspoon of vegetable oil or 2 tablespoons of salad dressing. · Limit sweets and added sugars to 5 servings or less a week. A serving is 1 tablespoon jelly or jam, ½ cup sorbet, or 1 cup of lemonade. · Eat less than 2,300 milligrams (mg) of sodium a day. If you limit your sodium to 1,500 mg a day, you can lower your blood pressure even more. Tips for success  · Start small. Do not try to make dramatic changes to your diet all at once.  You might feel that you are missing out on your favorite foods and then be more likely to not follow the plan. Make small changes, and stick with them. Once those changes become habit, add a few more changes. · Try some of the following:  ¨ Make it a goal to eat a fruit or vegetable at every meal and at snacks. This will make it easy to get the recommended amount of fruits and vegetables each day. ¨ Try yogurt topped with fruit and nuts for a snack or healthy dessert. ¨ Add lettuce, tomato, cucumber, and onion to sandwiches. ¨ Combine a ready-made pizza crust with low-fat mozzarella cheese and lots of vegetable toppings. Try using tomatoes, squash, spinach, broccoli, carrots, cauliflower, and onions. ¨ Have a variety of cut-up vegetables with a low-fat dip as an appetizer instead of chips and dip. ¨ Sprinkle sunflower seeds or chopped almonds over salads. Or try adding chopped walnuts or almonds to cooked vegetables. ¨ Try some vegetarian meals using beans and peas. Add garbanzo or kidney beans to salads. Make burritos and tacos with mashed rogers beans or black beans. Where can you learn more? Go to http://toya-juliet.info/. Enter S287 in the search box to learn more about \"DASH Diet: Care Instructions. \"  Current as of: September 21, 2016  Content Version: 11.4  © 1258-9249 [employer]. Care instructions adapted under license by [employer] (which disclaims liability or warranty for this information). If you have questions about a medical condition or this instruction, always ask your healthcare professional. [place] [address] any warranty or liability for your use of this information. High Blood Pressure: Care Instructions  Your Care Instructions    If your blood pressure is usually above 140/90, you have high blood pressure, or hypertension. That means the top number is 140 or higher or the bottom number is 90 or higher, or both.   Despite what a lot of people think, high blood pressure usually doesn't cause headaches or make you feel dizzy or lightheaded. It usually has no symptoms. But it does increase your risk for heart attack, stroke, and kidney or eye damage. The higher your blood pressure, the more your risk increases. Your doctor will give you a goal for your blood pressure. Your goal will be based on your health and your age. An example of a goal is to keep your blood pressure below 140/90. Lifestyle changes, such as eating healthy and being active, are always important to help lower blood pressure. You might also take medicine to reach your blood pressure goal.  Follow-up care is a key part of your treatment and safety. Be sure to make and go to all appointments, and call your doctor if you are having problems. It's also a good idea to know your test results and keep a list of the medicines you take. How can you care for yourself at home? Medical treatment  · If you stop taking your medicine, your blood pressure will go back up. You may take one or more types of medicine to lower your blood pressure. Be safe with medicines. Take your medicine exactly as prescribed. Call your doctor if you think you are having a problem with your medicine. · Talk to your doctor before you start taking aspirin every day. Aspirin can help certain people lower their risk of a heart attack or stroke. But taking aspirin isn't right for everyone, because it can cause serious bleeding. · See your doctor regularly. You may need to see the doctor more often at first or until your blood pressure comes down. · If you are taking blood pressure medicine, talk to your doctor before you take decongestants or anti-inflammatory medicine, such as ibuprofen. Some of these medicines can raise blood pressure. · Learn how to check your blood pressure at home. Lifestyle changes  · Stay at a healthy weight.  This is especially important if you put on weight around the waist. Losing even 10 pounds can help you lower your blood pressure. · If your doctor recommends it, get more exercise. Walking is a good choice. Bit by bit, increase the amount you walk every day. Try for at least 30 minutes on most days of the week. You also may want to swim, bike, or do other activities. · Avoid or limit alcohol. Talk to your doctor about whether you can drink any alcohol. · Try to limit how much sodium you eat to less than 2,300 milligrams (mg) a day. Your doctor may ask you to try to eat less than 1,500 mg a day. · Eat plenty of fruits (such as bananas and oranges), vegetables, legumes, whole grains, and low-fat dairy products. · Lower the amount of saturated fat in your diet. Saturated fat is found in animal products such as milk, cheese, and meat. Limiting these foods may help you lose weight and also lower your risk for heart disease. · Do not smoke. Smoking increases your risk for heart attack and stroke. If you need help quitting, talk to your doctor about stop-smoking programs and medicines. These can increase your chances of quitting for good. When should you call for help? Call 911 anytime you think you may need emergency care. This may mean having symptoms that suggest that your blood pressure is causing a serious heart or blood vessel problem. Your blood pressure may be over 180/110. ? For example, call 911 if:  ? · You have symptoms of a heart attack. These may include:  ¨ Chest pain or pressure, or a strange feeling in the chest.  ¨ Sweating. ¨ Shortness of breath. ¨ Nausea or vomiting. ¨ Pain, pressure, or a strange feeling in the back, neck, jaw, or upper belly or in one or both shoulders or arms. ¨ Lightheadedness or sudden weakness. ¨ A fast or irregular heartbeat. ? · You have symptoms of a stroke. These may include:  ¨ Sudden numbness, tingling, weakness, or loss of movement in your face, arm, or leg, especially on only one side of your body. ¨ Sudden vision changes.   ¨ Sudden trouble speaking. ¨ Sudden confusion or trouble understanding simple statements. ¨ Sudden problems with walking or balance. ¨ A sudden, severe headache that is different from past headaches. ? · You have severe back or belly pain. ?Do not wait until your blood pressure comes down on its own. Get help right away. ?Call your doctor now or seek immediate care if:  ? · Your blood pressure is much higher than normal (such as 180/110 or higher), but you don't have symptoms. ? · You think high blood pressure is causing symptoms, such as:  ¨ Severe headache. ¨ Blurry vision. ? Watch closely for changes in your health, and be sure to contact your doctor if:  ? · Your blood pressure measures 140/90 or higher at least 2 times. That means the top number is 140 or higher or the bottom number is 90 or higher, or both. ? · You think you may be having side effects from your blood pressure medicine. ? · Your blood pressure is usually normal, but it goes above normal at least 2 times. Where can you learn more? Go to http://toya-juliet.info/. Enter O045 in the search box to learn more about \"High Blood Pressure: Care Instructions. \"  Current as of: September 21, 2016  Content Version: 11.4  © 0319-2940 [employer]. Care instructions adapted under license by [employer] (which disclaims liability or warranty for this information). If you have questions about a medical condition or this instruction, always ask your healthcare professional. [place] [address] any warranty or liability for your use of this information.

## 2018-07-27 DIAGNOSIS — I10 ESSENTIAL HYPERTENSION WITH GOAL BLOOD PRESSURE LESS THAN 140/90: ICD-10-CM

## 2018-07-27 NOTE — TELEPHONE ENCOUNTER
Requested Prescriptions     Pending Prescriptions Disp Refills    lisinopril-hydroCHLOROthiazide (PRINZIDE, ZESTORETIC) 20-12.5 mg per tablet 90 Tab 0     Sig: Take 1 Tab by mouth daily.  naproxen (NAPROSYN) 500 mg tablet 60 Tab 2     Sig: Take 1 Tab by mouth two (2) times daily (with meals).  ibuprofen (MOTRIN) 800 mg tablet 50 Tab 5     Sig: Take 1 Tab by mouth every eight (8) hours as needed for Pain.

## 2018-07-29 RX ORDER — IBUPROFEN 800 MG/1
800 TABLET ORAL
Qty: 50 TAB | Refills: 5 | OUTPATIENT
Start: 2018-07-29

## 2018-07-29 RX ORDER — NAPROXEN 500 MG/1
500 TABLET ORAL 2 TIMES DAILY WITH MEALS
Qty: 60 TAB | Refills: 2 | OUTPATIENT
Start: 2018-07-29

## 2018-07-29 RX ORDER — LISINOPRIL AND HYDROCHLOROTHIAZIDE 12.5; 2 MG/1; MG/1
1 TABLET ORAL DAILY
Qty: 9030 TAB | Refills: 0 | Status: SHIPPED | OUTPATIENT
Start: 2018-07-29

## 2018-07-31 ENCOUNTER — TELEPHONE (OUTPATIENT)
Dept: FAMILY MEDICINE CLINIC | Age: 58
End: 2018-07-31

## 2018-07-31 NOTE — TELEPHONE ENCOUNTER
Call from pharmacy about script recently sent for Prinzide, quantity says 9,030 tablets. According to Dr. Jamison Bark note it should be 30 tablets. Pt needs to establish with a new PCP.  Pharmacy voiced understanding

## 2020-05-18 NOTE — PROGRESS NOTES
[facility] Screenings:    Encouraged completion of all 3 screenings. Discussed colonoscopy vs FIT and she wishes to proceed with FIT, knowing should it return positive she would need colonoscopy. Placed referral for mammogram as well and when scheduling nurse calls to schedule appointment asked her to discuss her request for 3-D imaging. Requested she call Dr. Bishop Yap office to schedule a well woman office visit for cervical cancer screening and verified she has the office number.

## 2020-10-23 ENCOUNTER — TRANSCRIBE ORDER (OUTPATIENT)
Dept: SCHEDULING | Age: 60
End: 2020-10-23

## 2020-10-23 DIAGNOSIS — Z12.31 VISIT FOR SCREENING MAMMOGRAM: Primary | ICD-10-CM

## 2022-05-18 ENCOUNTER — TRANSCRIBE ORDER (OUTPATIENT)
Dept: SCHEDULING | Age: 62
End: 2022-05-18

## 2022-05-18 DIAGNOSIS — Z12.31 VISIT FOR SCREENING MAMMOGRAM: Primary | ICD-10-CM

## 2022-05-19 ENCOUNTER — HOSPITAL ENCOUNTER (OUTPATIENT)
Dept: MAMMOGRAPHY | Age: 62
Discharge: HOME | End: 2022-05-19
Attending: STUDENT IN AN ORGANIZED HEALTH CARE EDUCATION/TRAINING PROGRAM
Payer: COMMERCIAL

## 2022-05-19 DIAGNOSIS — Z12.31 VISIT FOR SCREENING MAMMOGRAM: ICD-10-CM

## 2022-05-19 PROCEDURE — 77063 BREAST TOMOSYNTHESIS BI: CPT

## 2023-01-06 ENCOUNTER — HOSPITAL ENCOUNTER (OUTPATIENT)
Dept: GENERAL RADIOLOGY | Age: 63
Discharge: HOME | End: 2023-01-06
Payer: COMMERCIAL

## 2023-01-06 ENCOUNTER — TRANSCRIBE ORDER (OUTPATIENT)
Dept: REGISTRATION | Age: 63
End: 2023-01-06

## 2023-01-06 DIAGNOSIS — M25.561 RIGHT KNEE PAIN: Primary | ICD-10-CM

## 2023-01-06 DIAGNOSIS — M25.561 RIGHT KNEE PAIN: ICD-10-CM

## 2023-01-06 PROCEDURE — 73560 X-RAY EXAM OF KNEE 1 OR 2: CPT

## 2025-07-07 ENCOUNTER — TRANSCRIBE ORDERS (OUTPATIENT)
Facility: HOSPITAL | Age: 65
End: 2025-07-07

## 2025-07-07 DIAGNOSIS — Z13.820 OSTEOPOROSIS SCREENING: Primary | ICD-10-CM

## 2025-07-07 DIAGNOSIS — Z12.31 ENCOUNTER FOR SCREENING MAMMOGRAM FOR MALIGNANT NEOPLASM OF BREAST: Primary | ICD-10-CM
